# Patient Record
Sex: MALE | Race: WHITE | NOT HISPANIC OR LATINO | Employment: FULL TIME | URBAN - METROPOLITAN AREA
[De-identification: names, ages, dates, MRNs, and addresses within clinical notes are randomized per-mention and may not be internally consistent; named-entity substitution may affect disease eponyms.]

---

## 2017-12-04 ENCOUNTER — TELEPHONE (OUTPATIENT)
Dept: BEHAVIORAL HEALTH | Facility: CLINIC | Age: 27
End: 2017-12-04

## 2017-12-04 NOTE — TELEPHONE ENCOUNTER
Pt seeking cd eval for lodging adm. Pt report having been incarcerated past 7 days david griffin for recent charge of 5th degree possession.  This is pts 2nd charge of this nature.   Pt reports not being court ordered at this time, but wanting to be proactive.  Pt reports hx of heroin use.  lucero 11-27-17.

## 2017-12-07 ENCOUNTER — HOSPITAL ENCOUNTER (OUTPATIENT)
Dept: BEHAVIORAL HEALTH | Facility: CLINIC | Age: 27
Discharge: HOME | End: 2017-12-07
Attending: SOCIAL WORKER | Admitting: SOCIAL WORKER
Payer: COMMERCIAL

## 2017-12-07 VITALS — WEIGHT: 158 LBS | HEIGHT: 72 IN | BODY MASS INDEX: 21.4 KG/M2

## 2017-12-07 PROCEDURE — H0001 ALCOHOL AND/OR DRUG ASSESS: HCPCS

## 2017-12-07 ASSESSMENT — ANXIETY QUESTIONNAIRES
2. NOT BEING ABLE TO STOP OR CONTROL WORRYING: SEVERAL DAYS
7. FEELING AFRAID AS IF SOMETHING AWFUL MIGHT HAPPEN: NOT AT ALL
4. TROUBLE RELAXING: MORE THAN HALF THE DAYS
1. FEELING NERVOUS, ANXIOUS, OR ON EDGE: NEARLY EVERY DAY
GAD7 TOTAL SCORE: 7
6. BECOMING EASILY ANNOYED OR IRRITABLE: NOT AT ALL
5. BEING SO RESTLESS THAT IT IS HARD TO SIT STILL: NOT AT ALL
3. WORRYING TOO MUCH ABOUT DIFFERENT THINGS: SEVERAL DAYS

## 2017-12-07 ASSESSMENT — PATIENT HEALTH QUESTIONNAIRE - PHQ9: SUM OF ALL RESPONSES TO PHQ QUESTIONS 1-9: 16

## 2017-12-07 ASSESSMENT — PAIN SCALES - GENERAL: PAINLEVEL: MODERATE PAIN (4)

## 2017-12-07 NOTE — PROGRESS NOTES
[facility]  [address], [address]  [place] [address]        ADULT CD ASSESSMENT ADDENDUM      Patient Name: Kendell Prasad  Cell Phone:   Home: [phone] (home) none (work)   Mobile:   Telephone Information:   Mobile [phone]       Email:  [email or web address]  Emergency Contact: Beverley Karimi   Tel: [phone]    ________________________________________________________________________      The patient is  Single, no serious involvement    With which race do you identify? White    Family History   Mother   Living Father   Living   1 Step-mother   NA No Step-father   NA   Maternal Grandmother   Living Fraternal  Grandmother Living   Maternal Grandfather    Living Fraternal   Grandfather NA   1 Sister(s)   Living 1 Brother(s)   Living   1 Half-sister(s)   Living No Half-brother(s)   NA             Who raised you? (parents, grandparents, adoptive parents, step-parents, etc.)    Mother    Have any of your family members or significant others had problems with mental illness or substance abuse?  Please explain.    Depression in family    Do you have any children or Stepchildren? Yes, please explain: Luz (5), Alen (4)    Are you being investigated by Child Protection Services? No    Do you have a child protection worker, probation office or ? No    How would you describe your current finances?  In serious debt    If you are having problems, (unpaid bills, bankruptcy, IRS problems) please explain:  Yes, please explain: rebuilding    If working or a student are you able to function appropriately in that setting? n/a    Describe your preferred learning style:  by hands-on practice    What personal strengths do you have that can help you get sober?  Work ethic, family, motivated    Do you currently self-administer your medications?  N/A    Have you ever had to lie to people important to you about how much you mccoy?     No   Have you ever felt the need to bet more  and more money?     No   Have you ever attempted treatment for a gambling problem?     No   Have you ever touched or fondled someone else inappropriately or forced them to have sex with you against their will?     No   Are you or have you ever been a registered sex offender?     No   Is there any history of sexual abuse in your family?     No   Have you ever felt obsessed by your sexual behavior, such as having sex with many partners, masturbating often, using pornography often?     No   Have you ever received therapy or stayed in the hospital for mental health problems?     No   Have you ever hurt yourself, such as cutting, burning or hitting yourself?     No   Have you ever purged, binged or restricted yourself as a way to control your weight?     No   Are you on a special diet?     No   Do you have any concerns regarding your nutritional status?     No   Have you had any appetite changes in the last 3 months?     No   Have you had any weight loss or weight gain in the last 3 months?    If weight patient gained or lost was more than 10 lbs, then refer to program RN / attending Physician for assessment.     No   Was the patient informed of BMI?    Normal, No Intervention     Yes   Do you have any dental problems?     No   Have you ever lived through any trauma or stressful life events?     Yes, If yes explain: losing custody of chilren   In the past month, have you had any of the following symptoms related to the trauma listed above? (dreams, intense memories, flashbacks, physical reactions, etc.)     Yes, If yes explain: anxiety   Have you ever believed people were spying on you, or that someone was plotting against you or trying to hurt you?     No   Have you ever believed someone was reading your mind or could hear your thoughts or that you could actually read someone's mind or hear what another person was thinking?     No   Have you ever believed that someone of some force outside of yourself was putting  thoughts into your mind or made you act in a way that was not your usual self?  Have you ever though you were possessed?     No   Have you ever believed you were being sent special messages through the TV, radio or newspaper?     No   Have you ever heard things other people couldn't hear, such as voices or other noises?     No   Have you ever had visions when you were awake?  Or have you ever seen things other people couldn't see?     No       Suicide Screening Questions:   1. Are you feeling hopeless about the present/future?     No   2. Have you ever had thoughts about taking your life?     Yes   3. When did you have these thoughts?     Age 19   4. Do you have any current intent or active desire to take your life?     No   5. Do you have a plan to take your life?     No   6. Have you ever made a suicide attempt?     Yes, If yes explain: age 19 (OD)   7. Do you have access to pills, guns or other methods to kill yourself?     No     Guide to Risk Ratings   IDEATION: Active thoughts of suicide? INTENT: Intent to follow on suicide? PLAN: Plan to follow through on suicide? Level of Risk:   IF Yes Yes Yes Patient = High Emergent   IF Yes Yes No Patient = High Urgent/Non-Emergent   IF Yes No No Patient = Moderate Non-Urgent   IF No No   No Patient = Low Risk   The patient's ADDITIONAL RISK FACTORS and lack of PROTECTIVE FACTORS may increase their overall suicide risk ratings.     Patient's Responses (within the last 30 days)   IDEATION: Active thoughts of suicide?    No     INTENT: Intent to follow on suicide?    No     PLAN: Plan to follow through on suicide?    No     Determining the level of risk depends on the patient responses, suicide risk factors and protective factors.     Additional Risk Factors: Significant history of having untreated or poorly treated mental health symptoms  Significant history of untreated or poorly treated chronic pain issues  A recent loss that was significant to the patient, i.e. loss of  "job, loss of home, divorce, break-up, etc.  Substance abuse   Protective Factors:  Having people in his/her life that would prevent the patient from considering committing suicide (i.e. young children, spouse, parents, etc.)  Having easy access to supportive family members     Risk Status   Emergent? No   Urgent / Non-Emergent? No   Present / Non- Urgent? No    Low Risk? Yes, Evaluation Counselors - Document in Epic / SBAR to counselor \"No identified risk\" and Treatment Counselors - Assess weekly in progress notes under Dimension 3 and summarize in Discharge / Treatment summary under Dimension 3.   Additional information to support suicide risk rating: See Above       Mental Health Status   Physical Appearance/Attire: Appears stated age and Attire appropriate to age/situation   Hygiene: well groomed   Eye Contact: at examiner   Speech Rate:  regular   Speech Volume: regular   Speech Quality: fluid   Cognitive/Perceptual:  reality based   Cognition: memory intact    Judgment: intact   Insight: intact   Orientation:  time, place, person and situation   Thought::   logical    Hallucinations:  none   General Behavioral Tone: cooperative   Psychomotor Activity: no problem noted   Gait:  no problem   Mood: appropriate   Affect: congruence/appropriate   Counselor Notes: NA       Criteria for Diagnosis: DSM-5 Criteria for Substance Use Disorders      Opioid Use Disorder Severe - 304.00 (F11.20)  Amphetamine Use Disorder Severe - 304.40 (F15.20)  Tobacco Use Disorder Moderate - 305.10 (F17.200)      Level of Care   I.) Intoxication and Withdrawal: 0   II.) Biomedical:  1   III.) Emotional and Behavioral:  2   IV.) Readiness to Change:  0   V.) Relapse Potential: 3   VI.) Recovery Environmental: 3       Initial Problem List     The patient has poor coping skills  The patient lacks a sober peer support network  The patient lacks the ability to effectively manage his/her mental health issues    Patient/Client is willing to " follow treatment recommendations.  Yes    Counselor: Jennifer Alves [facility]      Vulnerable Adult Checklist for OUTPATIENTS     1.  Do you have a physical, emotional or mental infirmity or dysfunction?       No    2.  Does this issue impair your ability to provide for your own care without help, including providing yourself with food, shelter, clothing, healthcare or supervision?       No    3.  Because of this issue, I need assistance to protect myself from maltreatment by others.      No    Based on the above information:    This person is not a functional Vulnerable Adult according to [place] Statute 626.5572 subdivision 21.

## 2017-12-07 NOTE — TELEPHONE ENCOUNTER
----- Message from FAVIOLA Sandoval sent at 12/7/2017 12:09 PM CST -----  Regarding: Set up 1:1 appointment with me and mixed IOP CD Phase I appointments at [facility]  Please arrange to set up a 1:1 appointment with me at the [facility] location for 3:30PM on Monday, December 11. My provider number is [id number].     Also please arrange to set up 21 mixed IOP CD Phase I appointments for the above client beginning Monday, December 11 at 5:30PM. [id number].     Thanks    FAVIOLA Saldaña

## 2017-12-07 NOTE — PROGRESS NOTES
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 Severe   (F15.20) (304.40) Amphetamine type substance   Tobacco Use Disorder Moderate   (F17.200) (305.1)   Other (or unknown) Substance Use Disorder NA       Collateral Contact Summary   Number of contacts made: 1    Contact with referring person:  NA.    If court related records were reviewed, summarize here: NA    Information from collateral contacts supported/largely agreed with information from the client and associated risk ratings.      Rule 25 Assessment Summary and Plan   's Recommendation    Client is recommended to attend the outpatient treatment program at [facility], he was instructed to schedule appointments with providers to address mental health and pain issues prior to starting treatment.        Collateral Contacts     Name:    Terry Prasad   Relationship:    brother   Phone Number:    [phone] Releases:    Yes     snf roster has 2 fifth degree possession, another possession and the transaction fraud.  I feel like he has reached that rock bottom point and realized it is unhealthy and not safe, I think he wants to get better based upon our  conversation and him talking and us building our relationship.  I think he understands what he needs to do.      Collateral Contacts     Name:    n/a   Relationship:       Phone Number:       Releases:             anson Contacts      A problematic pattern of alcohol/drug use leading to clinically significant impairment or distress, as manifested by at least two of the following, occurring within a 12-month period:    There is a persistent desire or unsuccessful efforts to cut down or control alcohol/drug use  A great deal of time is spent in activities necessary to obtain alcohol, use alcohol, or recover from its effects.  Craving, or a strong desire or urge to use alcohol/drug  Continued alcohol use despite having persistent or recurrent social or interpersonal problems caused or exacerbated by the effects of alcohol/drug.  Important social, occupational, or recreational activities are given up or reduced because of alcohol/drug use.  Recurrent alcohol/drug use in situations in which it is physically hazardous.  Alcohol/drug use is continued despite knowledge of having a persistent or recurrent physical or psychological problem that is likely to have been caused or exacerbated by alcohol.  Tolerance, as defined by either of the following: A need for markedly increased amounts of alcohol/drug to achieve intoxication or desired effect. and A markedly diminished effect with continued use of the same amount of alcohol/drug.  Withdrawal, as manifested by either of the following: The characteristic withdrawal syndrome for alcohol/drug (refer to Criteria A and B of the criteria set for alcohol/drug withdrawal). and Alcohol/drug (or a closely related substance, such as a benzodiazepine) is taken to relieve or avoid withdrawal symptoms.      Specify if: In early remission:  After full criteria for alcohol/drug use disorder were previously met, none of the criteria for alcohol/drug use disorder have been met for at least  3 months but for less than 12 months (with the exception that Criterion A4,  Craving or a strong desire or urge to use alcohol/drug  may be met).     In sustained remission:   After full criteria for alcohol use disorder were previously met, non of the criteria for alcohol/drug use disorder have been met at any time during a period of 12 months or longer (with the exception that Criterion A4,  Craving or strong desire or urge to use alcohol/drug  may be met).   Specify if:   This additional specifier is used if the individual is in an environment where access to alcohol is restricted.    Mild: Presence of 2-3 symptoms    Moderate: Presence of 4-5 symptoms    Severe: Presence of 6 or more symptoms

## 2017-12-08 ASSESSMENT — ANXIETY QUESTIONNAIRES: GAD7 TOTAL SCORE: 7
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
## 2017-12-11 ENCOUNTER — HOSPITAL ENCOUNTER (OUTPATIENT)
Dept: BEHAVIORAL HEALTH | Facility: CLINIC | Age: 27
End: 2017-12-11
Attending: SOCIAL WORKER
Payer: COMMERCIAL

## 2017-12-11 PROCEDURE — H2035 A/D TX PROGRAM, PER HOUR: HCPCS | Mod: HQ

## 2017-12-12 ENCOUNTER — HOSPITAL ENCOUNTER (OUTPATIENT)
Dept: BEHAVIORAL HEALTH | Facility: CLINIC | Age: 27
End: 2017-12-12
Attending: SOCIAL WORKER
Payer: COMMERCIAL

## 2017-12-12 PROBLEM — F19.10 CHEMICAL ABUSE (H): Status: ACTIVE | Noted: 2017-12-12

## 2017-12-12 PROCEDURE — H2035 A/D TX PROGRAM, PER HOUR: HCPCS | Mod: HQ

## 2017-12-12 NOTE — PROGRESS NOTES
D- Client attended orientation 12/11/17 by himself. He was given an orientation packet which was reviewed with him in its entirety. The following was specifically reviewed with the client: Program philosophy, treatment goals, program schedules, education components, the family program, using treatment materials, program rules, client rights/responsibilities, information on HIV, STDS, Hepatitis, TB, information on use while pregnant, opioid information and Narcan information, program abuse prevention plan/reporting protocol, client grievance procedure, risks of treatment, confidentiality, treatment agreement, facility tour/safety information and information on co-occurring disorders. Client was also given information about his insurance and was given the business office contact information should he have any questions. Client was introduced to the stages of change.     I- Signing of paperwork, tour of facility, provided client with counselor's name and phone number. Client contributed to treatment plan goals. Client was told his start date at [facility] will  be 12/1117.      A- Client was an engaged and cooperative. He understands the stages of change and his current readiness for change.    P- Client will review orientation paperwork and materials. Client will share his feelings about attending group treatment at his first phase I session as well as history leading up to current admission to CD treatment. Seek out and attend sober support groups. Introduce self at first Phase I group. Give recent history leading up to attendance.

## 2017-12-12 NOTE — PROGRESS NOTES
Initial Services Plan        Before your first treatment group, please do the following    Immediate health & safety concerns: Look for a support network (such as AA, NA, DBT group, a Denominational group, etc.)    Suggestions for client during the time between intake & completion of treatment plan:  Introduce yourself to the treatment group.  Spend time getting to know your peers.  Complete the problem list for your treatment plan.  Review your patient or client handbook.  Identify concerns about whom to ask for family week    Client issues to be addressed in the first treatment sessions:  Arrange  and transportation as needed.  Identify motivations(s) for coming to treatment, i.e. legal, family, job, self  Identify concerns about coming into treatment, i.e. fear of failing again, sharing a room in treatment  Identify concerns about contacting BUDDY greene LADC  12/12/2017  2:01 PM

## 2017-12-15 NOTE — TELEPHONE ENCOUNTER
----- Message from FAVIOLA Sandoval sent at 12/14/2017  3:50 PM CST -----  Regarding: Set up 1:1 appointment with me at [facility] location  Please arrange to set up a 1:1 appointment with me for the above client at the [facility] location for Tuesday, Dec 19 at 4:30PM. My provider number is [phone].  Thanks  FAVIOLA Saldaña

## 2017-12-18 ENCOUNTER — BEH TREATMENT PLAN (OUTPATIENT)
Dept: BEHAVIORAL HEALTH | Facility: CLINIC | Age: 27
End: 2017-12-18

## 2017-12-18 NOTE — TREATMENT PLAN
Acknowledgement of Current Treatment Plan       I have reviewed my treatment plan with my therapist / counselor on ***. I agree with the plan as it is written in the electronic health record.    Name Signature   Kendell Prasad    Name of Therapist / Counselor    FAVIOLA Saldaña            Date of last use: _____________________________________________________

## 2017-12-18 NOTE — TREATMENT PLAN
[facility]  Adult Chemical Dependency Program  Treatment Plan Requirements    These services are provided by the facility for each patient/client according to the individual's treatment plan:    Individual and group counseling    Education    Transition services    Services to address any co-occurring mental illness    Service coordination    Initial Treatment Plan Goals:  1. Complete all the requirements of Program Orientation.  2. Maintain medication compliance throughout the program.  3. Complete requirements for workshop/skills groups based on identified issues on your problem list.  4. Complete the support group attendance feedback sheet weekly.  5. Gain family involvement in treatment process to address family issues from the problem list.  6. Attend and participate in all required groups per individual treatment plan.  7. Focus attention to individualized issues from the treatment plan.  8. Complete all requirements for UA's, alcohol screening tests and other testing.  9. Schedule a physical examination if recommended.    In addition to the above, complete all individual goals as specifically outlines on your treatment plan.    Criteria for discharge:  Patients/clients are discharged from the program following completion of the entire program including Phase I and II or acceptance of other post-treatment referrals such as half-way house, or aftercare at other facilities.  Patients/clients may also be discharged for inappropriate behavior or chemical use.      Favorable Discharge - Patients/clients have completed agreed upon treatment goals, understand their diagnosis and appear motivated about the follow-up care.    Guarded Discharge - Patients/clients have demonstrated some understanding of their diagnosis and recovery process, and have completed some of their treatment goals.  This prognosis also includes patients/clients who have completed some treatment goals but have not made  commitment to community support or follow through with referrals.    Unfavorable Discharge - Patients/clients have not completed agreed upon treatment goals due to their own choice, have limited understanding of their diagnosis, and have shown minimal or inconsistent behavior conducive to recovery.  Those patients/clients discharged due to behavioral problems will also be unfavorable discharge.       Adult CD Treatment Plan                                Kendell Prasad   [id number]   1990 27 year old Male    Acute Intoxication/Withdrawal Potential     DIMENSION 1  RISK FACTOR: 0     SUBSTANCE USE DISORDERS:  Heroin and Amphetamines            Date Assigned Source Area of Treatment Focus / Goal / Treatment Strategies    Target  Date Initials Outcome Date Completed   December 18, 2017   Self -  Current and Assessment -  Current  Area of Treatment Focus:   Substance use, cravings and urges.  Last use date was reported as 11/29/2017.       Goal:   Develop effective strategies to maintain sobriety.      Treatment Strategies:   Report to counselor and group any alcohol or drug use. 4/15/2018 KRN Refused     Client was DC'd from [facility] CD program for missing 7 of 11 scheduled phase I appoiontemnts. No call, no show.  January 10, 2018       Biomedical Conditions and Complaints     DIMENSION 2  RISK FACTOR: 1             Date Assigned Source Area of Treatment Focus / Goal / Treatment Strategies Target  Date Initials Outcome Date Completed   December 18, 2017   Self -  Current and Assessment -  Current  Area of Treatment Focus:  Client/Patient has a medical diagnosis of from EMR: .     Past Medical History:   Diagnosis Date     Arthritis      Depression      H/O suicide attempt 9/2010    medication overdose     Ulcer (H)      Goal:   Follow recommendations of medical provider.    Treatment Strategies:  D Report date of scheduled appointment to counselor, Report change in severity of symptoms to staff.  and Continue  to take prescribed medications and follow-up with medical interventions while in program. 4/15/2018 KRN Refused January 10, 2018       December 18, 2017   Self -  Current and Assessment -  Current  Problem: Lack of regular exercise program.   Goal: Establish program of regular physical activity while in CD treatment.   Intervention: Report on regular exercise program each week.  4/15/2018 KRN Refused January 10, 2018       Emotional/Behavioral/Cognitive Conditions and Complications     DIMENSION 3  RISK FACTOR: 2             Date Assigned Source Area of Treatment Focus / Goal / Treatment Strategies Target  Date Initials Outcome Date Completed     December 19, 2017   Self -  Current and Assessment -  Current  Area of Treatment Focus:   History of symptoms of depression, anxiety, PTSD..    and Client/Patient may have an undiagnosed mental health issue.       Goal:   Schedule a psychatric evaluation.    Treatment Strategies:    Meet/make appointment with psychiatrist. 1/1/2018 KRN Refused January 10, 2018       December 19, 2017   Self -  Current and Assessment -  Current  Problem: Client reports use of non prescribed chemicals as coping mechanism for BEH symptoms.   Goal: Become medication compliant. , Understand the relationship between addiction and mental health issues. and Learn skills to express anxiety, depression in a healthy and appropriate way.  Intervention:  Identify 5 coping skills to reduce anxiety, depression..  Practice techniques daily and when needed. and List 5 ways your addiction has impacted your mental health.  2/1/2018 KRN Refused January 10, 2018         Readiness to Change     DIMENSION 4  RISK FACTOR: 0             Date Assigned Source Area of Treatment Focus / Goal / Treatment Strategies Target  Date Initials Outcome Date Completed   December 19, 2017   Self -  Current and Assessment -  Current  Area of Treatment Focus:  {Increase internal motivation for sobriety. Move up stages of change.      Goal:   Understand the impact your substance use has had on you., Understand the impact your substance use has had on your family and significant relationships. and Increase internal motivation.    Treatment Strategies:   Invite family and concerned persons to family program. and Participate in spiritual care groups. 1/15/2018 KRN Refused January 10, 2018        Relapse/Continues Use/Continues Problem Potential     DIMENSION 5  RISK FACTOR: 3                 Date Assigned Source Area of Treatment Focus / Goal / Treatment Strategies Target  Date Initials Outcome Date Completed   December 19, 2017   Self -  Current and Assessment -  Current  Area of Treatment Focus:   Lacks a daily routine that includes healthy and sober living skills.       Goal:   Develop sober coping and living skills.    Treatment Strategies:   Develop a relapse prevention plan including lifestyle changes, recovery activities, daily structure, self-care, support systems, spirituality, work, finances, recreation and crisis management.  and Identify and begin attending sober support groups. 1/1/2018 KRN Refused January 10, 2018       December 19, 2017   Self -  Current and Assessment -  Current  Problem: History of relapse and self-sabotage. , Client/Patient does not recognize relapse triggers and warning signs.    and Client/Patient has cross addiction with amphetamines and opioids..     Goal: Identify personal triggers and relapse warning signs., Identify what led to your last relapse., Identify and understand personal relapse and self-sabotage patterns. and Develop insight on how cross-addiction has impacted your life.  Intervention: Complete the triggers and cravings assignment and include alternative behaviors. and Describe your last relapse and what led to it including feelings and situations. 1/15/2018 KRN Refused January 10, 2018       Recovery Environment     DIMENSION 6  RISK FACTOR: 3                 Date Assigned Source Area of  Treatment Focus / Goal / Treatment Strategies Target  Date Initials Outcome Date Completed   December 19, 2017   Self -  Current and Assessment -  Current  Area of Treatment Focus:   Lacks sober support network.       Goal:   Develop a sober support network.    Treatment Strategies:   List 10 situations, people, emotional responses that are unhealthy.  Develop a plan to avoid or manage them.   3/1/2018 KRN Refused January 10, 2018       December 19, 2017   Self -  Current and Assessment -  Current  Problem: Client/Patient reports involvement with legal.  Current  Goal: Resolve issues with legal and possible probation after court date(s).  Intervention: Comply with the requirements of legal/probation. . 4/15/2018 KRN Refused January 10, 2018         Individual abuse prevention plan (required for lodging plus) : specific actions, referral:   No additional protection measures required other than the Program Abuse Prevention Plan - No     All interventions that are designated as  current  will need to be completed in order to transition out of treatment with a favorable prognosis.  The treatment plan is a flexible document and a work in progress.  Interventions and goals may be added at any time to customize plan to each individual s needs.  Client may work with therapist to change interventions as long as they pertain to the goals stipulated in the plan and/or are clinically driven.

## 2017-12-19 NOTE — TELEPHONE ENCOUNTER
"Talked to client after he returned my earlier call. He was upset and crying stating he had \"slept through\" treatment yesterday because he's been stressed and unable to sleep because his grandmother is doing poorly in the hospital and he is \"her only support\". Not thinking about CD treatment, but does want to return. Reported his uncle is flying up to help tomorrow and he intends to attend a 1:1 with this counselor and group.  Rodolfo Bridges, LADC  "

## 2017-12-19 NOTE — TELEPHONE ENCOUNTER
Left VM with client asking about no call, no show from last night and if he planned to attend group and our scheduled 1:1 on 12/19. Asked for call back.  FAVIOLA Saldaña

## 2017-12-20 ENCOUNTER — HOSPITAL ENCOUNTER (OUTPATIENT)
Dept: BEHAVIORAL HEALTH | Facility: CLINIC | Age: 27
End: 2017-12-20
Attending: SOCIAL WORKER
Payer: COMMERCIAL

## 2017-12-20 NOTE — TREATMENT PLAN
Patient Safety Plan Template    Name:   Kendell Prasad YOB: 1990 Age:  27 year old MR Number:  [id number]   Step 1: Warning signs (Thoughts, images, mood, situation, behavior) that a crisis may be developin. {Wildcard & NA:579271}     2. {Wildcard & NA:230571}     3. {Wildcard & NA:419560}     Step 2: Internal coping strategies - Things I can do to take my mind off of my problems without contacting another person (relaxation technique, physical activity):     1. {Wildcard & NA:199857}     2. {Wildcard & NA:981835}     3. {Wildcard & NA:862607}     Step 3: People and social settings that provide distraction:     1. Name: {Wildcard & NA:839356}   Phone: {Wildcard & NA:040665}   2. Name: {Wildcard & NA:714608}   Phone: {Wildcard & NA:705719}   3. Place: {Wildcard & NA:638342}   4. Place: {Wildcard & NA:407200}     The one thing that is most important to me and worth living for is: ***     Step 4: People whom I can ask for help:     1. Name: {Wildcard & NA:243695}   Phone: {Wildcard & NA:185607}     2. Name: {Wildcard & NA:915323}   Phone: {Wildcard & NA:577935}     3. Name: {Wildcard & NA:422048}   Phone: {Wildcard & NA:014548}     Step 5: Professionals or agencies I can contact during a crisis:     1. Clinician Name: {Wildcard & NA:955692}   Phone: {Wildcard & NA:339071}   Clinician Pager or Emergency Contact #: {Wildcard & NA:913668}     2. Clinician Name: FAVIOLA Saldaña   Phone: [phone]     Clinician Pager or Emergency Contact #: NA     3. Local Urgent Care Services: {Wildcard & NA:944180}    Urgent Care Services Address: {Wildcard & NA:565928}    Urgent Care Services Phone: {Wildcard & NA:343865}     4. Suicide Prevention Lifeline Phone: 3-923-403-EWNG (5867)     Step 6: Making the environment safe:     1. {Wildcard & NA:822784}     2. {Wildcard & NA:114041}     Safety Plan Template 2008 Chen Jay and Claudy Carlos is reprinted with the express permission of the  authors.  No portion of the Safety Plan Template may be reproduced without the express, written permission.  You can contact the authors at [email or web address] or jose [email or web address].

## 2017-12-26 ENCOUNTER — HOSPITAL ENCOUNTER (OUTPATIENT)
Dept: BEHAVIORAL HEALTH | Facility: CLINIC | Age: 27
End: 2017-12-26
Attending: SOCIAL WORKER
Payer: COMMERCIAL

## 2017-12-26 PROCEDURE — H2035 A/D TX PROGRAM, PER HOUR: HCPCS | Mod: HQ

## 2017-12-27 ENCOUNTER — HOSPITAL ENCOUNTER (OUTPATIENT)
Dept: BEHAVIORAL HEALTH | Facility: CLINIC | Age: 27
End: 2017-12-27
Attending: SOCIAL WORKER
Payer: COMMERCIAL

## 2017-12-27 PROCEDURE — H2035 A/D TX PROGRAM, PER HOUR: HCPCS | Mod: HQ

## 2017-12-27 NOTE — PROGRESS NOTES
CD ADULT Progress Note     Treatment Plan Review completed on:  12/26    Attendance Dates: 12/26, 12/27    Total # of Group Sessions:  2     MONDAY TUESDAY WEDNESDAY THURSDAY FRIDAY SATURDAY SUNDAY Total   Group Therapy  2 2        Specialty Groups*           1:1           Family Program           [place]             Phase II             Absent   2        Total             *Specialty Groups include Mental Health Care, Assertiveness and Communication, Sobriety Maintenance Skills, Spiritual Care, Stress Management, Relapse Prevention, Family Systems.                  Learning Style:  Hands on  practice    Staff member contributing:  Jessenia Dela Cruz MA, [facility] [place][note 2]    Received supervision:  No    Client:  contributed to goals and plan    Did Client receive a copy of treatment plan/revised plan:  1/3/18    Changes to Treatment Plan:  No    Client agrees with plan/revised plan:  NA    Any changes in Vulnerable Adult Status:  No    Substance Use Disorders:  Opioid Use Disorder Severe (F11.20), Stimulant Related Disorder Severe (F15.20)  Amphetamine type substance and Tobacco Use Disorder Moderate (F17.200)      ASAM Risk Ratings and Data       DIMENSION 1: Acute Intoxication/Withdrawal  The client's ability to cope with withdrawal symptoms and current state of intoxication       Acute Intoxication/Withdrawal - Current Risk Factor:  0    Reporting sober date of 11/29/17    Goals:  Remain sober, report all use of chemicals to counselor.     Data:  Client reported last use on 11/28. He has been absent for an extended period. Client said he is struggling and wants to get an appointment and consider Suboxone.     DIMENSION 2:  Biomedical Conditions and Complaints  The degree to which any physical disorder would interfere with treatment for substance abuse and the client's ability to tolerate any related discomfort     Biomedical Conditions and Complaints - Current Risk Factor:  1    Goals: Remain medically compliant. From client  EMR:     Past Medical History:   Diagnosis Date     Arthritis      Depression      H/O suicide attempt 9/2010    medication overdose     Ulcer (H)      Data:    Client seemed depressed and and said he was miserable. He struggles with excessive negativity and seems overwhelmed in recovery.    DIMENSION 3:  Emotional/Behavioral/Cognitive Conditions and Complications  The degree to which any condition or complications are likely to interfere with treatment for substance abuse or with function in significant life areas and the likelihood of risk of harm to self or others.     Emotional/Behavioral - Current Risk Factor:  2    DSM-5 Diagnoses:   Reported by client depression, anxiety, PTSD.     Suicide Assessment:  Risk Status    Ideation - Active thoughts of suicide Intent to follow through on suicide Plan for completing suicide    Yes No Yes No Yes No   Emergent  x  x  x   Urgent / Non-Emergent  x  x  x   Non- Urgent  x  x  x   No Current/Active Risk   x  x  x     Goals: Client needs to meet with psychiatrist/physician and discuss depression..     Data:  12/26 Client reported that he was miserable and had no positive experiences he could reflect upon.    DIMENSION 4:  Readiness to Change  Consider the amount of support and encouragement necessary to keep the client involved in treatment.     Readiness to Change - Current Risk Factor:  3    Goals:  Move up stages of change. Better understand disease of addiction.     Data:  Client is struggling with his recovery. He has been absent for several weeks and dos not seem motivated to chage. He did say that he would like to talk to someone about Suboxone. He was very negative about his recovery and ability to sustain it.     DIMENSION 5:  Relapse/Continued Use/Continued Problem Potential  Consider the degree to which the client recognizes relapse issues and has the skills to prevent relapse of either substance use or mental health problems.     Relapse/Continued Use/Continued  "Problem Potential - Current Risk Factor:  3    Goals:  Attend treatment regularly, complete assignments and follow all treatment recommendations.  Treatment Contract needs to be considered.      Data:   Client has been absent several weeks. He expressed feeling negative and hopeless about his recovery and his life.    DIMENSION 6:  Recovery Environment  Consider the degree to which key areas of the client's life are supportive of or antagonistic to treatment participation and recovery.     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     Support group attended this week:  No    Did family agree to attend family week:  NA    If yes:  none schedule this week    Goals:  TBD with treatment plan.     Data:  No new updates.          Intervention: Group members discussed relapse prevention plans. Additionally, client reviewed DBT overview and states of mind-wise, reasonable and emotional- and were able to demonstrate understanding of these by sharing two examples of what these mean to them. Client also reviewed holiday relapse prevention plan and DBT \"Radical Acceptance\" skill and shared an issue or experience that he struggled to accept.    Assessment:  Stages of Change Model  Contemplation Client has not attended regularly. Expressed negative feelings and hopelessness. He needs individual counseling and requested referral suggestions to find physician who can prescribe Suboxone.     Plan:  Seek out and attend sober support. Return treatment plan goals to counselor. Report on progress in locating MAT provider for opioid cravings and WD symptoms.   "

## 2018-01-10 NOTE — TELEPHONE ENCOUNTER
Left VM with client that he is DC'd from group for missed appointments. Asked for call back to discuss options for referral to higher level of care since OP isn't working for him apparently.   Rodolfo Bridges, LADC

## 2018-02-05 ENCOUNTER — HOSPITAL ENCOUNTER (EMERGENCY)
Facility: CLINIC | Age: 28
Discharge: PSYCHIATRIC OR SUBSTANCE USE FACILITY | End: 2018-02-06
Attending: EMERGENCY MEDICINE | Admitting: EMERGENCY MEDICINE
Payer: COMMERCIAL

## 2018-02-05 VITALS
BODY MASS INDEX: 20.9 KG/M2 | OXYGEN SATURATION: 100 % | WEIGHT: 154.32 LBS | DIASTOLIC BLOOD PRESSURE: 82 MMHG | SYSTOLIC BLOOD PRESSURE: 120 MMHG | RESPIRATION RATE: 16 BRPM | HEIGHT: 72 IN | TEMPERATURE: 98.1 F

## 2018-02-05 DIAGNOSIS — F19.10 SUBSTANCE ABUSE (H): ICD-10-CM

## 2018-02-05 DIAGNOSIS — R45.851 SUICIDAL IDEATION: ICD-10-CM

## 2018-02-05 LAB
ANION GAP SERPL CALCULATED.3IONS-SCNC: 4 MMOL/L (ref 3–14)
BASOPHILS # BLD AUTO: 0.1 10E9/L (ref 0–0.2)
BASOPHILS NFR BLD AUTO: 0.6 %
BUN SERPL-MCNC: 18 MG/DL (ref 7–30)
CALCIUM SERPL-MCNC: 8.7 MG/DL (ref 8.5–10.1)
CHLORIDE SERPL-SCNC: 100 MMOL/L (ref 94–109)
CO2 SERPL-SCNC: 32 MMOL/L (ref 20–32)
CREAT SERPL-MCNC: 0.94 MG/DL (ref 0.66–1.25)
DIFFERENTIAL METHOD BLD: NORMAL
EOSINOPHIL # BLD AUTO: 0.2 10E9/L (ref 0–0.7)
EOSINOPHIL NFR BLD AUTO: 2 %
ERYTHROCYTE [DISTWIDTH] IN BLOOD BY AUTOMATED COUNT: 12.2 % (ref 10–15)
GFR SERPL CREATININE-BSD FRML MDRD: >90 ML/MIN/1.7M2
GLUCOSE SERPL-MCNC: 112 MG/DL (ref 70–99)
HCT VFR BLD AUTO: 40.3 % (ref 40–53)
HGB BLD-MCNC: 13.7 G/DL (ref 13.3–17.7)
IMM GRANULOCYTES # BLD: 0 10E9/L (ref 0–0.4)
IMM GRANULOCYTES NFR BLD: 0.5 %
LYMPHOCYTES # BLD AUTO: 2.3 10E9/L (ref 0.8–5.3)
LYMPHOCYTES NFR BLD AUTO: 26.7 %
MCH RBC QN AUTO: 28.5 PG (ref 26.5–33)
MCHC RBC AUTO-ENTMCNC: 34 G/DL (ref 31.5–36.5)
MCV RBC AUTO: 84 FL (ref 78–100)
MONOCYTES # BLD AUTO: 0.9 10E9/L (ref 0–1.3)
MONOCYTES NFR BLD AUTO: 10.5 %
NEUTROPHILS # BLD AUTO: 5.2 10E9/L (ref 1.6–8.3)
NEUTROPHILS NFR BLD AUTO: 59.7 %
NRBC # BLD AUTO: 0 10*3/UL
NRBC BLD AUTO-RTO: 0 /100
PLATELET # BLD AUTO: 287 10E9/L (ref 150–450)
POTASSIUM SERPL-SCNC: 3.5 MMOL/L (ref 3.4–5.3)
RBC # BLD AUTO: 4.8 10E12/L (ref 4.4–5.9)
SODIUM SERPL-SCNC: 136 MMOL/L (ref 133–144)
WBC # BLD AUTO: 8.7 10E9/L (ref 4–11)

## 2018-02-05 PROCEDURE — 80048 BASIC METABOLIC PNL TOTAL CA: CPT | Performed by: EMERGENCY MEDICINE

## 2018-02-05 PROCEDURE — 80329 ANALGESICS NON-OPIOID 1 OR 2: CPT | Performed by: EMERGENCY MEDICINE

## 2018-02-05 PROCEDURE — 85025 COMPLETE CBC W/AUTO DIFF WBC: CPT | Performed by: EMERGENCY MEDICINE

## 2018-02-05 PROCEDURE — 90791 PSYCH DIAGNOSTIC EVALUATION: CPT

## 2018-02-05 PROCEDURE — 99285 EMERGENCY DEPT VISIT HI MDM: CPT | Mod: 25

## 2018-02-05 PROCEDURE — 36415 COLL VENOUS BLD VENIPUNCTURE: CPT | Performed by: EMERGENCY MEDICINE

## 2018-02-05 ASSESSMENT — ENCOUNTER SYMPTOMS: WOUND: 1

## 2018-02-06 ENCOUNTER — HOSPITAL ENCOUNTER (INPATIENT)
Facility: CLINIC | Age: 28
LOS: 6 days | Discharge: HOME | DRG: 885 | End: 2018-02-12
Attending: PSYCHIATRY & NEUROLOGY | Admitting: PSYCHIATRY & NEUROLOGY
Payer: COMMERCIAL

## 2018-02-06 DIAGNOSIS — F41.1 GAD (GENERALIZED ANXIETY DISORDER): Primary | ICD-10-CM

## 2018-02-06 DIAGNOSIS — F33.1 MODERATE EPISODE OF RECURRENT MAJOR DEPRESSIVE DISORDER (H): ICD-10-CM

## 2018-02-06 DIAGNOSIS — F11.20 HEROIN USE DISORDER, SEVERE (H): ICD-10-CM

## 2018-02-06 PROBLEM — R45.851 SUICIDAL IDEATION: Status: ACTIVE | Noted: 2018-02-06

## 2018-02-06 LAB
APAP SERPL-MCNC: <2 MG/L (ref 10–20)
INTERPRETATION ECG - MUSE: NORMAL

## 2018-02-06 PROCEDURE — 99223 1ST HOSP IP/OBS HIGH 75: CPT | Mod: AI | Performed by: PSYCHIATRY & NEUROLOGY

## 2018-02-06 PROCEDURE — 12400001 ZZH R&B MH UMMC

## 2018-02-06 PROCEDURE — 25000132 ZZH RX MED GY IP 250 OP 250 PS 637: Performed by: PSYCHIATRY & NEUROLOGY

## 2018-02-06 RX ORDER — TRAZODONE HYDROCHLORIDE 50 MG/1
50 TABLET, FILM COATED ORAL
Status: DISCONTINUED | OUTPATIENT
Start: 2018-02-06 | End: 2018-02-06

## 2018-02-06 RX ORDER — OLANZAPINE 10 MG/1
10 TABLET ORAL
Status: DISCONTINUED | OUTPATIENT
Start: 2018-02-06 | End: 2018-02-12 | Stop reason: HOSPADM

## 2018-02-06 RX ORDER — HYDROXYZINE HYDROCHLORIDE 25 MG/1
25-50 TABLET, FILM COATED ORAL EVERY 4 HOURS PRN
Status: DISCONTINUED | OUTPATIENT
Start: 2018-02-06 | End: 2018-02-12 | Stop reason: HOSPADM

## 2018-02-06 RX ORDER — TRAZODONE HYDROCHLORIDE 50 MG/1
50 TABLET, FILM COATED ORAL AT BEDTIME
Status: DISCONTINUED | OUTPATIENT
Start: 2018-02-06 | End: 2018-02-12 | Stop reason: HOSPADM

## 2018-02-06 RX ORDER — BUPRENORPHINE AND NALOXONE 4; 1 MG/1; MG/1
1 FILM, SOLUBLE BUCCAL; SUBLINGUAL DAILY
Status: DISCONTINUED | OUTPATIENT
Start: 2018-02-06 | End: 2018-02-08

## 2018-02-06 RX ORDER — ACETAMINOPHEN 325 MG/1
650 TABLET ORAL EVERY 4 HOURS PRN
Status: DISCONTINUED | OUTPATIENT
Start: 2018-02-06 | End: 2018-02-12 | Stop reason: HOSPADM

## 2018-02-06 RX ORDER — ALUMINA, MAGNESIA, AND SIMETHICONE 2400; 2400; 240 MG/30ML; MG/30ML; MG/30ML
30 SUSPENSION ORAL EVERY 4 HOURS PRN
Status: DISCONTINUED | OUTPATIENT
Start: 2018-02-06 | End: 2018-02-12 | Stop reason: HOSPADM

## 2018-02-06 RX ORDER — CLONIDINE HYDROCHLORIDE 0.1 MG/1
0.1 TABLET ORAL 2 TIMES DAILY PRN
Status: DISCONTINUED | OUTPATIENT
Start: 2018-02-06 | End: 2018-02-10

## 2018-02-06 RX ORDER — QUETIAPINE FUMARATE 200 MG/1
200 TABLET, FILM COATED ORAL DAILY
Status: DISCONTINUED | OUTPATIENT
Start: 2018-02-06 | End: 2018-02-12 | Stop reason: HOSPADM

## 2018-02-06 RX ORDER — LORAZEPAM 0.5 MG/1
0.5 TABLET ORAL DAILY
Status: COMPLETED | OUTPATIENT
Start: 2018-02-06 | End: 2018-02-06

## 2018-02-06 RX ORDER — BUPRENORPHINE AND NALOXONE 4; 1 MG/1; MG/1
1 FILM, SOLUBLE BUCCAL; SUBLINGUAL DAILY
Status: DISCONTINUED | OUTPATIENT
Start: 2018-02-07 | End: 2018-02-06

## 2018-02-06 RX ORDER — BUPRENORPHINE AND NALOXONE 4; 1 MG/1; MG/1
1 FILM, SOLUBLE BUCCAL; SUBLINGUAL DAILY
Status: DISCONTINUED | OUTPATIENT
Start: 2018-02-06 | End: 2018-02-06

## 2018-02-06 RX ORDER — BISACODYL 10 MG
10 SUPPOSITORY, RECTAL RECTAL DAILY PRN
Status: DISCONTINUED | OUTPATIENT
Start: 2018-02-06 | End: 2018-02-12 | Stop reason: HOSPADM

## 2018-02-06 RX ORDER — OLANZAPINE 10 MG/2ML
10 INJECTION, POWDER, FOR SOLUTION INTRAMUSCULAR
Status: DISCONTINUED | OUTPATIENT
Start: 2018-02-06 | End: 2018-02-12 | Stop reason: HOSPADM

## 2018-02-06 RX ADMIN — TRAZODONE HYDROCHLORIDE 50 MG: 50 TABLET ORAL at 20:15

## 2018-02-06 RX ADMIN — BUPRENORPHINE HYDROCHLORIDE, NALOXONE HYDROCHLORIDE 1 FILM: 4; 1 FILM, SOLUBLE BUCCAL; SUBLINGUAL at 19:56

## 2018-02-06 RX ADMIN — QUETIAPINE FUMARATE 200 MG: 200 TABLET ORAL at 15:59

## 2018-02-06 RX ADMIN — LORAZEPAM 0.5 MG: 0.5 TABLET ORAL at 15:59

## 2018-02-06 NOTE — ED PROVIDER NOTES
"  History     Chief Complaint:  Suicide Attempt    HPI   Kendell Prasad is a 27 year old male with a history of drug abuse, depression, and suicide attempt who presents with a suicide attempt. The patient's mother brought him to the ED today but has since left. The patient states he \"hates life today\" because his best friend  yesterday and his other best friend  a few months ago. He notes he cut both of his arms with an Exacto knife in attempt to relieve his emotional pain earlier today. He notes he had a prior suicide attempt when he was 19 where he intentionally overdosed on Percocet. He also notes he is a heroin addict and last injected heroin an hour ago. He states he no longer sees a psychologist but when he did, he was taking medication for his anxiety and depression, was able to work, and was sober for 6 months (until 2017). The patient is no longer taking his medications and is not able to work due to the severity of his mental health issues and drug addiction. He reports he has never harmed himself like this before and was not trying to kill himself, but that \"things would be easier if he wasn't alive.\" He denies any other health concerns or pain. Of note, the patient's last tetanus vaccine was in  (up to date).    Allergies:  Clindamycin  Latex  Neosporin  Penicillins  Pertussis vaccine  Vancomycin     Medications:    The patient is currently on no regular medications.    Past Medical History:    Arthritis  Depression  Suicide attempt  Ulcer  Chemical abuse    Past Surgical History:    Appendectomy  Eye surgery  Tonsillectomy  Finger surgery    Family History:    Depression  Anxiety  Bipolar disorder  Substance abuse    Social History:  Smoking status: Current every day smoker  Alcohol use: Yes  Every day heroin user  Marital Status:  Single [1]     Review of Systems   Skin: Positive for wound (bilateral arm lacerations).   Psychiatric/Behavioral: Positive for suicidal ideas.   All " other systems reviewed and are negative.      Physical Exam     Patient Vitals for the past 24 hrs:   BP Temp Heart Rate Resp SpO2 Height Weight   02/05/18 2224 120/82 98.1  F (36.7  C) 98 16 100 % 1.829 m (6') 70 kg (154 lb 5.2 oz)       Physical Exam  General: Patient is alert and interactive when I enter the room  Head:  The scalp, face, and head appear normal  Eyes:  Conjunctivae are normal  ENT:    The nose is normal    Pinnae are normal    External acoustic canals are normal  Neck:  Trachea midline  CV:  Pulses are normal.    Resp:  No respiratory distress   Abdomen:      Soft, non-tender, non-distended  Musc:  Normal muscular tone    No major joint effusions    No asymmetric leg swelling  Skin:  Superficial linear lacerations to dorsal aspect of right arm and volar aspect of left arm, neurovascularly intact distal to injury    Neuro:  Speech is normal and fluent. Face is symmetric.     Moving all extremities well.   Psych: Awake.Labile affect. Intermittently aggressive.     Emergency Department Course   ECG (23:09:31):  Rate 99 bpm. MA interval 138. QRS duration 80. QT/QTc 326/418. P-R-T axes 71 75 37. Normal sinus rhythm. Normal ECG.  Interpreted at 2314 by Alyssia Garcia MD.     Laboratory:  Acetaminophen Level: <2  BMP: Glucose 112 (H), o/w WNL (Creatinine 0.94)  CBC: WNL (WBC 8.7, HGB 13.7, )     Emergency Department Course:  2230: Marilyn from DEC assessed the patient.    Past medical records, nursing notes, and vitals reviewed.  2250: I performed an exam of the patient and obtained history, as documented above.  IV inserted and blood drawn.  EKG obtained, results above.    0440: I rechecked the patient. Explained findings to the patient.     Findings and plan explained to the Patient.    Patient will be transferred to [place] via EMS. Discussed the case with Kisha, who will admit the patient to a monitored bed for further monitoring, evaluation, and treatment.     Impression & Plan    Medical  Decision Making:  Kendell Prasad is a 27 year old male with a history of anxiety, depression, and substance abuse who presents with concerns for suicidal ideations. Patient clearly attempted to harm himself with cutting on his arms and admits to heroin use today and has been using more regularly. Patient is cooperative when I first talked to him. He denies any other attempts to hurt himself. DEC assessed him and thought he should be admitted to [place] for inpatient psychiatric hospitalization. I did medically clear him with overdose lab work which was unremarkable. In terms of his cuts they were all very superficial and did not require any repair and his tetanus is up to date. Patient was initially reasonably comfortable with going to [place]. He did not want to but was comfortable with this. When the ambulance arrived, he actually started to become very agitated with me and the staff here. He stated that we did not clean his wounds; however, they had been cleaned. He said he would rather be arrested, and when I informed him that wasn't an option he got very upset. He called his mother and was yelling and cursing. I suspect that he was angry that he was going to be admitted and is now wanting drugs. However, with his psychiatric history, not being on medications, his attempts to hurt himself and with DEC thinking he needs to be admitted, I do not think he is safe to be discharged so patient was maintained on the hold and transferred to [place]. He finally was amenable to going and did not need medication. Patient transferred in stable condition.    Diagnosis:    ICD-10-CM   1. Suicidal ideation R45.851   2. Substance abuse F19.10       Disposition:  Transferred to [place]    Yesy Maza  2/5/2018   [facility] EMERGENCY DEPARTMENT    FELICITAS, Yesy Maza, am serving as a scribe at 10:50 PM on 2/5/2018 to document services personally performed by Alyssia Garcia MD based on my  observations and the provider's statements to me.        Alyssia Garcia MD  02/07/18 0043       Alyssia Garcia MD  02/07/18 0043

## 2018-02-06 NOTE — ED NOTES
When EMS arrived pt was very upset that he was being transferred to [place]. Pt was yelling at the doctor and his mother on the phone. After he told his mother he needed to get out of the hospital so he can go and use drugs again he hung up on her. And then told Security he was willing to get on cart and go to [place]. One second patient would be angry and yelling and with some verbal deesculation patient could be calm and willing to follow commands. This is the first time patient has had any behavioral issues while arriving the Emergency Department. Pt states his wounds never got cleaned however they were during the beginning of his ER stay.

## 2018-02-06 NOTE — PROGRESS NOTES
"  INITIAL PSYCHOSOCIAL ASSESSMENT AND NOTE  I have reviewed the chart met with the patient, and developed Care Plan.  Information for assessment was obtained from: review of records.    PRESENTING PROBLEM: Per notes: \"26 yo cau male admitted voluntarily from [facility] secondary to suicidal ideation with cutting on inner aspect of both forearms with an exacto knife.  States he has been using Heroin on a daily basis for the past six months.  Did have a period of sobriety following treatment at [facility] which he estimates at 18 months.  Psychosocial stressors include the death of two friends and losing custody of his children to his \"Ex.\"  Released from senior living on December 6th.  Had been in senior living for six days.  Has another court date in March.\"  The following areas have been assessed:  History of Mental Health and Chemical Dependency: hx dx of depression and substance use; pt reports he had 18 months of sobriety recently, up until he relapsed approx 6 months ago after the death of his friend  Per notes: Rodolfo Bridges [facility]  1/10/2018    Cancel all future mixed IOP CD Phase I appointments for client at [facility].  Please arrange to cancel all future mixed IOP CD Phase I appointments for the above client at the [facility] effective immediately. Client is DC'd from program.   Living Situation: \"I live with my grandparents\"  Significant Life Events (Illness, Abuse, Trauma, Death): his children live out of state in [place] and pt is not in contact with them.  He reports this as very stressful.   Family Description (Constellation, Family Psychiatric History): Pt has two children ages four and five who live out of state with their mother.    Financial Status: unemployed with no source of income.  He was unable to report the last time he was employed.   Occupational History: no response  Educational Background: high school graduate     Service History: none  Legal Issues: Current legal. " "  Ethnic/Cultural Issues:  male  Spiritual Orientation: Denominational bill  Social Functioning (organizations, interests): \"I like to work\" was how pt answered this question.      Current Treatment providers:   none  Social Service Assessment/Plan:   Pt will receive  psychiatric services.  CTC to arrange clinically appropriate discharge plan.  Pt appears to need detox assistance and a Chemical Health assessment will be arranged.      "

## 2018-02-06 NOTE — PROGRESS NOTES
"RN ADMISSION NOTE:  26 yo cau male admitted voluntarily from [facility] secondary to suicidal ideation with cutting on inner aspect of both forearms with an exacto knife.  States he has been using Heroin on a daily basis for the past six months.  Did have a period of sobriety following treatment at [facility] which he estimates at 18 months.  Psychosocial stressors include the death of two friends and losing custody of his children to his \"Ex.\"  Released from [facility] on December 6th.  Had been in [facility] for six days.  Has another court date in March.  "

## 2018-02-06 NOTE — PLAN OF CARE
Problem: General Plan of Care (Inpatient Behavioral)  Goal: Team Discussion  Team Plan:   BEHAVIORAL TEAM DISCUSSION    Participants: Anya Lemos [facility]  Progress: new admission.  Pt is a 28 yo  male admitted voluntarily from [facility] ER secondary to suicidal ideation with cutting on inner aspect of both forearms with an exacto knife.  States he has been using Heroin on a daily basis for the past six months  Continued Stay Criteria/Rationale: needs further assessment   Medical/Physical: see chart  Precautions:   Behavioral Orders   Procedures     Code 1 - Restrict to Unit     Latex precautions     Routine Programming     As clinically indicated     Self Injury Precaution     Status 15     Every 15 minutes.     Withdrawal precautions     Plan: assess, monitor and stabilize  Rationale for change in precautions or plan: new admission

## 2018-02-06 NOTE — IP AVS SNAPSHOT
[address] [place] [address]    Phone:  [phone]                                       After Visit Summary   2/6/2018    Kendell Prasad    MRN: [id number]           After Visit Summary Signature Page     I have received my discharge instructions, and my questions have been answered. I have discussed any challenges I see with this plan with the nurse or doctor.    ..........................................................................................................................................  Patient/Patient Representative Signature      ..........................................................................................................................................  Patient Representative Print Name and Relationship to Patient    ..................................................               ................................................  Date                                            Time    ..........................................................................................................................................  Reviewed by Signature/Title    ...................................................              ..............................................  Date                                                            Time

## 2018-02-06 NOTE — ED NOTES
Agree with triage note. No acute distress noted. Mother at bedside. Pt resting on cart. Pt is calm and cooperative. Will cont to monitor.

## 2018-02-06 NOTE — PROGRESS NOTES
Patient OWS  @ 1300 (monitored r/t patient reported daily heroin use). Patient has remained in room sleeping, easily roused, this shift. No behaviors.    /68  Pulse 77  Temp 98.5  F (36.9  C)  Resp 16  Ht 1.829 m (6')  Wt 69.9 kg (154 lb)  BMI 20.89 kg/m2. No complaints.    Nursing will continue to monitor.

## 2018-02-06 NOTE — PROGRESS NOTES
"CLINICAL NUTRITION SERVICES - ASSESSMENT NOTE     Nutrition Prescription    RECOMMENDATIONS FOR MDs/PROVIDERS TO ORDER:  None today    Malnutrition Status:    Unable to determine due to incomplete nutrition status validation.    Recommendations already ordered by Registered Dietitian (RD):  -None today    Future/Additional Recommendations:  -Follow up with pt when available to check po intakes and obtain nutrition hx.  -Monitor po intakes and weight trends.  If po intakes decreased, consider ordering nutrition supplement such as Ensure Plus with meals.  -Document % intakes at meals in chart to aid in nutrition assessment.     REASON FOR ASSESSMENT  Kendell Prasad is a/an 27 year old male assessed by the dietitian for Admission Nutrition Risk Screen for unintentional loss of 10# or more in the past two months    NUTRITION HISTORY  Per chart review, admitted voluntarily from [facility] ER secondary to suicidal ideation with cutting on inner aspect of both forearms with an exacto knife.  States he has been using Heroin on a daily basis for the past six months.     Unable to obtain nutrition hx today due to pt sleeping at time of RD visit.    CURRENT NUTRITION ORDERS  Diet: Regular  Intake/Tolerance:  Per staff, pt has been sleeping this am and so has not eaten so far today. Unable to wake pt for interview.    LABS  Labs reviewed    MEDICATIONS  Medications reviewed    ANTHROPOMETRICS  Height: 182.9 cm (6' 0\")  Most Recent Weight: 69.9 kg (154 lb)    IBW:  178 lbs  BMI: Normal BMI  Weight History:   Per chart review, no recent weight hx since December 2016.  Weight at OSH (8/1/17)  150 lbs.  Pt is currently @ 87% IBW.  Wt Readings from Last 10 Encounters:   02/06/18 69.9 kg (154 lb)   02/05/18 70 kg (154 lb 5.2 oz)   12/16/16 73.8 kg (162 lb 11.2 oz)   12/06/16 74.8 kg (165 lb)   04/14/15 72.6 kg (160 lb)   04/13/15 72.6 kg (160 lb)   03/30/15 72.6 kg (160 lb)   03/01/15 68 kg (150 lb)   02/16/14 71.7 kg (158 lb) "   09/10/13 63.5 kg (140 lb)       Dosing Weight: 70 kg  (current weight)    ASSESSED NUTRITION NEEDS  Estimated Energy Needs: 8163-8946 kcals/day (30 - 35 kcals/kg )  Justification: Repletion  Estimated Protein Needs: 70-84 grams protein/day (1 - 1.2 grams of pro/kg)  Justification: Repletion/wound healing  Estimated Fluid Needs: (1 mL/kcal)   Justification: Per provider pending fluid status    PHYSICAL FINDINGS  See malnutrition section below.    -cutting on inner aspect of both forearms with an exacto knife.      MALNUTRITION  % Intake: Unable to assess  % Weight Loss: Unable to assess  Subcutaneous Fat Loss: Unable to assess  Muscle Loss: Unable to assess  Fluid Accumulation/Edema: None noted  Malnutrition Diagnosis: Unable to determine due to incomplete nutrition status validation (pt sleeping at time of RD visit).    NUTRITION DIAGNOSIS  Predicted suboptimal nutrient intake related to presume decreased intakes due to mental health status and substance use as evidenced by chart review and (+) admission nutrition risk screen for unintended weight loss.    INTERVENTIONS  Implementation  Nutrition Education: Unable to complete due to pt not available.  Asked unit staff about intakes so far and requested documenting po in chart to aid in nutrition assessment.     Goals  Patient to consume % of nutritionally adequate meal trays TID, or the equivalent with supplements/snacks.     Monitoring/Evaluation  Progress toward goals will be monitored and evaluated per protocol.    Urszula Scott RD, JOHNATHON

## 2018-02-06 NOTE — H&P
"[facility], [place]   Psychiatric History & Physical  Admission date: 2/6/2018        Chief Complaint:   \"Just want help for my chemical dependency\"         HPI:     Kendell is a 27 year old male with history of opiate and meth use disorder who was admitted on a voluntary basis from [facility] ED for self injurious behaviors (superfical cuts to forearms B/L) with recent (and prevalent) heroin and meth use. Reports from [facility] indicate that the patient has had ongoing IV heroin use (daily for the past 6 months). He was reported to be very labile in his emotions, vacillating from angry to calm/redirectable. He also was heard threatening his mother that he would use drugs if he leaves the hospital. When he was seen in the hospital today, he was laying in bed for most of the day, but was open to speak with me when I had awakened him. He mentions that he uses IV heroin every day for the past 6 months. He injects in both arms, and denies using any dirty needles, or sharing needles. His last use was around 8:23 PM yesterday (less than 24 hours ago). When asked about other medications, he mentions \"Just just Heroin and Meth that's what I do. He did not elaborate as to how much meth he does, and told that his heroin use has been the main issue. He endorse some depressive symptoms, including low moods, hopelessness, worthlessness, guilt, sleep problems, and passive SI without plan or intent. He mentions that he superficially cut himself with an xacto knife to feel the pain. He denies any current thoughts about self harm. He initially told me he was thinking about going, but later mentioned that he would stay for treatment. He used to go to MI/CD program, but missed 7 out of 11 session, which he attributed to \"transportation issue.\" He mentioned that if transportation issues weren't a problem, he would be willing to go to CD treatment.         Past Psychiatric History:   Past inpatient treatment: Denies "   Current outpatient psychiatrist: None. Has PCP with Dr. Karine Toscano at [facility] on 4/2017.   Current outpatient therapist: Denies   Other (ACT team etc): None   Past suicide attempts: Denies   History of commitments: Denies   History of ECT: Denies         Substance Use and History:   Prevalent meth and heroin. Denies other drugs. Utox not done. Reports indicate that he has a year sobriety when he was in [facility] for CD treatment.         Past Medical History:   PAST MEDICAL HISTORY:   Past Medical History:   Diagnosis Date     Arthritis      Depression      H/O suicide attempt 9/2010    medication overdose     Ulcer (H)        PAST SURGICAL HISTORY:   Past Surgical History:   Procedure Laterality Date     APPENDECTOMY       Exploration of complex left index finger laceration with  8/2005     surgery for lazy eye       TONSILLECTOMY               Family History:   FAMILY HISTORY:   Family History   Problem Relation Age of Onset     Depression Mother      Anxiety Disorder Mother      Bipolar Disorder Mother      Unknown/Adopted Father      Depression Maternal Grandmother      Anxiety Disorder Maternal Grandmother      MENTAL ILLNESS Maternal Grandmother      Depression Maternal Grandfather      Substance Abuse Maternal Grandfather      Unknown/Adopted Paternal Grandmother      Substance Abuse Paternal Grandmother      Depression Brother      Anxiety Disorder Brother      Substance Abuse Brother      MENTAL ILLNESS Brother      Autism Spectrum Disorder Sister      Substance Abuse Sister      Schizophrenia No family hx of      Suicide No family hx of      Dementia No family hx of      Emmet Disease No family hx of      Parkinsonism No family hx of      Intellectual Disability (Mental Retardation) No family hx of            Social History:   SOCIAL HISTORY:   Social History   Substance Use Topics     Smoking status: Current Every Day Smoker     Packs/day: 0.50     Smokeless tobacco: Never  Used     Alcohol use Yes      Comment: occ            Physical ROS:   The patient endorsed anxiety, and restlessness. The remainder of 10-point review of systems was negative except as noted in HPI.         PTA Medications:     Prescriptions Prior to Admission   Medication Sig Dispense Refill Last Dose     PARoxetine HCl (PAXIL PO) Take by mouth daily   2/5/2018 at Unknown time     Azithromycin (ZITHROMAX PO)    More than a month at Unknown time     ibuprofen (ADVIL/MOTRIN) 200 MG tablet Take 3 tablets (600 mg) by mouth every 8 hours as needed for mild pain 30 tablet 0 More than a month at Unknown time     methocarbamol (ROBAXIN) 500 MG tablet Take 1 tablet (500 mg) by mouth 4 times daily as needed for muscle spasms 15 tablet 0 More than a month at Unknown time     BACLOFEN PO    More than a month at Unknown time     QUEtiapine Fumarate (SEROQUEL XR PO) Take 200 mg by mouth 2 times daily   More than a month at Unknown time          Allergies:     Allergies   Allergen Reactions     Clindamycin Hcl      Latex      Latex tape     Neosporin [Neomycin-Polymyx-Gramicid]      Penicillins Hives     Pertussis Vaccine      Cried for 36 hours     Vancomycin           Labs:     Recent Results (from the past 48 hour(s))   EKG 12 lead    Collection Time: 02/05/18 11:09 PM   Result Value Ref Range    Interpretation ECG Click View Image link to view waveform and result    Acetaminophen level    Collection Time: 02/05/18 11:21 PM   Result Value Ref Range    Acetaminophen Level <2 mg/L   Basic metabolic panel    Collection Time: 02/05/18 11:21 PM   Result Value Ref Range    Sodium 136 133 - 144 mmol/L    Potassium 3.5 3.4 - 5.3 mmol/L    Chloride 100 94 - 109 mmol/L    Carbon Dioxide 32 20 - 32 mmol/L    Anion Gap 4 3 - 14 mmol/L    Glucose 112 (H) 70 - 99 mg/dL    Urea Nitrogen 18 7 - 30 mg/dL    Creatinine 0.94 0.66 - 1.25 mg/dL    GFR Estimate >90 >60 mL/min/1.7m2    GFR Estimate If Black >90 >60 mL/min/1.7m2    Calcium 8.7 8.5 -  "10.1 mg/dL   CBC with platelets differential    Collection Time: 02/05/18 11:21 PM   Result Value Ref Range    WBC 8.7 4.0 - 11.0 10e9/L    RBC Count 4.80 4.4 - 5.9 10e12/L    Hemoglobin 13.7 13.3 - 17.7 g/dL    Hematocrit 40.3 40.0 - 53.0 %    MCV 84 78 - 100 fl    MCH 28.5 26.5 - 33.0 pg    MCHC 34.0 31.5 - 36.5 g/dL    RDW 12.2 10.0 - 15.0 %    Platelet Count 287 150 - 450 10e9/L    Diff Method Automated Method     % Neutrophils 59.7 %    % Lymphocytes 26.7 %    % Monocytes 10.5 %    % Eosinophils 2.0 %    % Basophils 0.6 %    % Immature Granulocytes 0.5 %    Nucleated RBCs 0 0 /100    Absolute Neutrophil 5.2 1.6 - 8.3 10e9/L    Absolute Lymphocytes 2.3 0.8 - 5.3 10e9/L    Absolute Monocytes 0.9 0.0 - 1.3 10e9/L    Absolute Eosinophils 0.2 0.0 - 0.7 10e9/L    Absolute Basophils 0.1 0.0 - 0.2 10e9/L    Abs Immature Granulocytes 0.0 0 - 0.4 10e9/L    Absolute Nucleated RBC 0.0           Physical and Psychiatric Examination:     /68  Pulse 77  Temp 98.5  F (36.9  C)  Resp 16  Ht 1.829 m (6')  Wt 69.9 kg (154 lb)  BMI 20.89 kg/m2  Weight is 154 lbs 0 oz  Body mass index is 20.89 kg/(m^2).    Physical Exam:  I have reviewed the physical exam as documented by ED provider agree with findings and assessment and have no additional findings to add at this time.    Mental Status Exam:  Appearance:  male, very disheveled, poor hygiene. Forearms/arms covered in tattoos.   Attitude:  Irritable, dismissive  Eye Contact:  Poor   Mood:  Irritable, \"Whatever\"   Affect:  Subdued and shows some exaggerated range    Speech: Rapid in rate, regular in rhythm.   Language: fluent and intact in English  Psychomotor, Gait, Musculoskeletal:  Some restlessness. Otherwise unremarkable   Throught Process: Linear  Associations:  Intact   Thought Content: No current SI/HI/AH/VH.   Insight:  Poor   Judgement:  Poor   Oriented to:  Person, place, time   Attention Span and Concentration: Average   Recent and Remote Memory:  " Intact   Fund of Knowledge: Below Average          Admission Diagnoses:   Unspecified Mood Disorder vs. Major Depressive Disorder   Opiate Use Disorder  Amphetamine Use Disorder             Assessment & Plan:     Assessment:  Kendell appears to have prevalent IV heroin use (along with meth use), and has had worsening depressive symptoms with recent self injurious behavior. As per reports, he's had multiple psychosocial factors that could precipitate/perpetuate his use including death of two friends, and loosing his kids to his ex. He also has pending court dates later in the year. He has not been taking any antidepressant medications, but is open to starting on medications that claimed helped him such as Seroquel. Regarding his drug use, his last use of heroin was appoximately yesterday at 8:23 PM. His most recent vitals were unremarkable. He did seem somewhat restless during interview today, therefor he could possibly be starting to go through relapse symptoms. I will add Suboxone, and Opiate Withdrawal scale, and have Suboxone given when he starts to exhibit withdrawal scores of 8 or higher, or around 8:30 PM (which would be 24 hrs post use), whichever comes first.     Plan:  - Seroquel 200 mg Daily   - Ativan 0.5 mg once  - Suboxone 4 mg (given daily, starting when COWs score is 8 or greater, or around 8:30 PM, whichever comes first).   - Trazodone 50 mg   - CD assessment/Rule 25.     Legal:  Voluntary     Disposition:  Unclear, likely CD tx facility in 1-2 weeks.        Juan Stokes MD  [facility] Psychiatry      Spent  45  minutes on encounter, >50% of which was spent in counseling and/or coordination of care, consisting of taking history, reviewing system, and discussing medication and side effects

## 2018-02-06 NOTE — IP AVS SNAPSHOT
MRN:[id number]                      After Visit Summary   2/6/2018    Kendell Prasad    MRN: [id number]           Thank you!     Thank you for choosing [facility] for your care. Our goal is always to provide you with excellent care.        Patient Information     Date Of Birth          1990        Designated Caregiver       Most Recent Value    Caregiver    Will someone help with your care after discharge? yes    Name of designated caregiver see note     Phone number of caregiver see note     Caregiver address see note       About your hospital stay     You were admitted on:  February 6, 2018 You last received care in the:  [facility]    You were discharged on:  February 12, 2018       Who to Call     For medical emergencies, please call 911.  For non-urgent questions about your medical care, please call your primary care provider or clinic, None          Attending Provider     Provider Specialty    Juan Stokes MD Psychiatry       Primary Care Provider Fax #    Physician No Ref-Primary [phone]      Further instructions from your care team        Behavioral Discharge Planning and Instructions    Summary:  You were admitted on 2/6/2018  due to Depression, Suicidal Ideations, Self Injurious Behaviors and Chemical Use Issues.  You were treated by Dr Juan Stokes MD and discharged on 2/12/2018 [facility]      Principal Diagnosis:     Unspecified Mood Disorder vs. Major Depressive Disorder   Opiate Use Disorder  Amphetamine Use Disorder      Health Care Follow-up Appointments:     You had a chemical health evaluation with Mary Torres ([facility] chemical health ) on Saturday 2/10/18.  You can reach her at [phone].  She recommended a residential program but you are requesting an outpatient program and to follow up with suboxone.  Because you insisted on being discharged today, we were unable to provide final referral appointments, etc.  Mary  reported that you can consider getting suboxone treatment through [facility] in [place].      Should you decide to attend psychiatry or therapy you can schedule with:  [facility]  [address] [facility] [address]   Phone: [phone]      Mental Health Crisis Program provides crisis intervention to residents of [facility]. This service is available 24 hours a day, 7 days a week. The Crisis Program provides telephone crisis intervention and mobile on-site response to assess and stabilize immediate crisis.  For more information, please call [phone]    [facility]  Mental health service in [place], [place]  Address: [address] [place] [address]  Phone: [phone]      If no appointments scheduled, explain: pt refused.  He states he will follow up by getting his own appointments.    Attend all scheduled appointments with your outpatient providers. Call at least 24 hours in advance if you need to reschedule an appointment to ensure continued access to your outpatient providers.   Major Treatments, Procedures and Findings:  You were provided with: a psychiatric assessment, assessed for medical stability, medication evaluation and/or management, milieu management and detox.    Symptoms to Report: losing more sleep, mood getting worse, thoughts of suicide or relapse.    Early warning signs can include: increased depression or anxiety sleep disturbances increased thoughts or behaviors of suicide or self-harm and substance use.      Safety and Wellness:  Take all medicines as directed.  Make no changes unless your doctor suggests them. Follow treatment recommendations.  Refrain from alcohol and non-prescribed drugs.  If there is a concern for safety, call 221.    Resources:   Crisis Intervention: 261.529.5819 or 826-907-7404 (TTY: 857.138.9567).  Call anytime for help.  National [place] on Mental Illness (www.[place].nandini.org): 414.827.4537  "or 188-139-8668.  Alcoholics Anonymous (www.alcoholics-anonymous.org): Check your phone book for your local chapter.  Suicide Awareness Voices of Education (SAVE) (www.save.org): 772-900-RKLR (6392)  National Suicide Prevention Line (www.mentalhealthmn.org): 164-639-MHXQ (0684)  [facility] Crisis Response 752-503-7820  Crisis text line: Text \"START\" to 897-899. Free, confidential, 24/7.    The treatment team has appreciated the opportunity to work with you.     If you have any questions or concerns our unit number is [phone].  You may be receiving a follow-up phone call within the next three days from a representative from behavioral health.    You have identified the best phone number to reach you as [phone]          Pending Results     No orders found from 2/4/2018 to 2/7/2018.            Admission Information     Date & Time Provider Department Dept. Phone    2/6/2018 Juan Stokes MD [facility] [phone]      Your Vitals Were     Blood Pressure Pulse Temperature Respirations Height Weight    114/76 96 98  F (36.7  C) (Oral) 16 1.829 m (6') 69.9 kg (154 lb)    BMI (Body Mass Index)                   20.89 kg/m2           MyChart Information     [facility] gives you secure access to your electronic health record. If you see a primary care provider, you can also send messages to your care team and make appointments. If you have questions, please call your primary care clinic.  If you do not have a primary care provider, please call [phone] and they will assist you.        Care EveryWhere ID     This is your Care EveryWhere ID. This could be used by other organizations to access your [place] medical records  [id number]        Equal Access to Services     SENAIT WATKINS AH: Sal Lau, lex morales, mono dalton. So [facility] [phone].    ATENCIÓN: Si habla español, tiene a gillis disposición servicios gratuitos de " asistencia lingüística. Ursula al [phone].    We comply with applicable federal civil rights laws and [place] laws. We do not discriminate on the basis of race, color, national origin, age, disability, sex, sexual orientation, or gender identity.               Review of your medicines      START taking        Dose / Directions    cloNIDine 0.1 MG tablet   Commonly known as:  CATAPRES        Dose:  0.1 mg   Take 1 tablet (0.1 mg) by mouth 2 times daily as needed (for anxiety, agitation)   Quantity:  60 tablet   Refills:  1       hydrOXYzine 25 MG tablet   Commonly known as:  ATARAX        Dose:  50 mg   Take 2 tablets (50 mg) by mouth every 4 hours as needed for anxiety   Quantity:  60 tablet   Refills:  1       OLANZapine 10 MG tablet   Commonly known as:  zyPREXA        Dose:  10 mg   Take 1 tablet (10 mg) by mouth every 2 hours as needed (anxiety, depressed moods.)   Quantity:  30 tablet   Refills:  1       * QUEtiapine 100 MG tablet   Commonly known as:  SEROquel   Replaces:  SEROQUEL XR PO        Dose:  100 mg   Take 1 tablet (100 mg) by mouth At Bedtime   Quantity:  30 tablet   Refills:  1       * QUEtiapine 200 MG tablet   Commonly known as:  SEROquel        Dose:  200 mg   Start taking on:  2/13/2018   Take 1 tablet (200 mg) by mouth daily   Quantity:  30 tablet   Refills:  1       traZODone 50 MG tablet   Commonly known as:  DESYREL        Dose:  50 mg   Take 1 tablet (50 mg) by mouth At Bedtime   Quantity:  30 tablet   Refills:  1       * Notice:  This list has 2 medication(s) that are the same as other medications prescribed for you. Read the directions carefully, and ask your doctor or other care provider to review them with you.      CONTINUE these medicines which have NOT CHANGED        Dose / Directions    ibuprofen 200 MG tablet   Commonly known as:  ADVIL/MOTRIN        Dose:  600 mg   Take 3 tablets (600 mg) by mouth every 8 hours as needed for mild pain   Quantity:  30 tablet   Refills:  0          STOP taking     BACLOFEN PO           methocarbamol 500 MG tablet   Commonly known as:  ROBAXIN           PAXIL PO           SEROQUEL XR PO   Replaced by:  QUEtiapine 100 MG tablet           ZITHROMAX PO                Where to get your medicines      These medications were sent to [id number] - [place], [place] - [address] [address]  [address] [address], [place] [address]     Phone:  [phone]     cloNIDine 0.1 MG tablet    hydrOXYzine 25 MG tablet    OLANZapine 10 MG tablet    QUEtiapine 100 MG tablet    QUEtiapine 200 MG tablet    traZODone 50 MG tablet                Protect others around you: Learn how to safely use, store and throw away your medicines at www.disposemymeds.org.             Medication List: This is a list of all your medications and when to take them. Check marks below indicate your daily home schedule. Keep this list as a reference.      Medications           Morning Afternoon Evening Bedtime As Needed    cloNIDine 0.1 MG tablet   Commonly known as:  CATAPRES   Take 1 tablet (0.1 mg) by mouth 2 times daily as needed (for anxiety, agitation)   Last time this was given:  0.1 mg on 2/10/2018  5:56 PM                                hydrOXYzine 25 MG tablet   Commonly known as:  ATARAX   Take 2 tablets (50 mg) by mouth every 4 hours as needed for anxiety   Last time this was given:  50 mg on 2/12/2018  2:58 AM                                ibuprofen 200 MG tablet   Commonly known as:  ADVIL/MOTRIN   Take 3 tablets (600 mg) by mouth every 8 hours as needed for mild pain                                OLANZapine 10 MG tablet   Commonly known as:  zyPREXA   Take 1 tablet (10 mg) by mouth every 2 hours as needed (anxiety, depressed moods.)   Last time this was given:  10 mg on 2/12/2018  2:58 AM                                * QUEtiapine 100 MG tablet   Commonly known as:  SEROquel   Take 1 tablet (100 mg) by mouth At Bedtime   Last time this was given:  200 mg on 2/12/2018  8:09 AM                                 * QUEtiapine 200 MG tablet   Commonly known as:  SEROquel   Take 1 tablet (200 mg) by mouth daily   Start taking on:  2/13/2018   Last time this was given:  200 mg on 2/12/2018  8:09 AM                                traZODone 50 MG tablet   Commonly known as:  DESYREL   Take 1 tablet (50 mg) by mouth At Bedtime   Last time this was given:  50 mg on 2/11/2018  8:12 PM                                * Notice:  This list has 2 medication(s) that are the same as other medications prescribed for you. Read the directions carefully, and ask your doctor or other care provider to review them with you.

## 2018-02-06 NOTE — PROGRESS NOTES
02/06/18 0532   Patient Belongings   Did you bring any home meds/supplements to the hospital?  No   Patient Belongings clothing;cell phone/electronics   Disposition of Belongings Locker   Belongings Search Yes   Clothing Search Yes   Second Staff Shira PA   General Info Comment patient came in with a green note book, figit spiner, head phone, black slipper/shoe, black pant, grey short, grey shirt, grey winter hat, sock, little stuff cow, a black back pack full of clothing SEARCHED, a blue hygiene bag full of razors and tools for shaving, camo  back pack full of clothing , hygiene products, crafts, and wallet all SEARCHED.   NO money in patient belongings.   A               Admission:  I am responsible for any personal items that are not sent to the safe or pharmacy.  [facility] is not responsible for loss, theft or damage of any property in my possession.    Signature:  _________________________________ Date: _______  Time: _____                                              Staff Signature:  ____________________________ Date: ________  Time: _____      2nd Staff person, if patient is unable/unwilling to sign:    Signature: ________________________________ Date: ________  Time: _____     Discharge:  [facility] has returned all of my personal belongings:    Signature: _________________________________ Date: ________  Time: _____                                          Staff Signature:  ____________________________ Date: ________  Time: _____

## 2018-02-06 NOTE — ED NOTES
Bed: ED07  Expected date:   Expected time:   Means of arrival:   Comments:  Hold for beh pt in triage

## 2018-02-06 NOTE — PROGRESS NOTES
Placed call to Mary Torres ([facility] chemical health ) at [phone]  She can see pt this Saturday at 10am and may be able to come in sooner but will call if that is the case

## 2018-02-06 NOTE — ED NOTES
Pt with cutting injuries to TONIA arms w/exacto knife in attempt to hurt self.  Also used heroin 2hrs PTA.  Here with mother, wants help.

## 2018-02-07 PROCEDURE — 25000132 ZZH RX MED GY IP 250 OP 250 PS 637: Performed by: PSYCHIATRY & NEUROLOGY

## 2018-02-07 PROCEDURE — 12400001 ZZH R&B MH UMMC

## 2018-02-07 RX ADMIN — QUETIAPINE FUMARATE 200 MG: 200 TABLET ORAL at 09:05

## 2018-02-07 RX ADMIN — OLANZAPINE 10 MG: 10 TABLET, FILM COATED ORAL at 18:15

## 2018-02-07 RX ADMIN — BUPRENORPHINE HYDROCHLORIDE, NALOXONE HYDROCHLORIDE 1 FILM: 4; 1 FILM, SOLUBLE BUCCAL; SUBLINGUAL at 09:05

## 2018-02-07 ASSESSMENT — ACTIVITIES OF DAILY LIVING (ADL)
GROOMING: INDEPENDENT
DRESS: SCRUBS (BEHAVIORAL HEALTH);INDEPENDENT
LAUNDRY: WITH SUPERVISION
ORAL_HYGIENE: INDEPENDENT
DRESS: INDEPENDENT
ORAL_HYGIENE: INDEPENDENT
GROOMING: HANDWASHING;INDEPENDENT

## 2018-02-07 NOTE — PROGRESS NOTES
Bupe started at OWS = 8.     Patient ate small amount, napped and fluids adequate.    /62  Pulse 94  Temp 98.9  F (37.2  C) (Oral)  Resp 16  Ht 1.829 m (6')  Wt 69.9 kg (154 lb)  BMI 20.89 kg/m2. Patient reports feeling sweaty, irritable and stomach cramps.    Nursing will continue to monitor.

## 2018-02-07 NOTE — PROGRESS NOTES
Pt observed staying in room most of the shift.  Pt did not  Attend groups, ate meals, and no socialization with others.  Pt cooperative and pleasant.       02/07/18 1502   Behavioral Health   Hallucinations denies / not responding to hallucinations   Thinking distractable;poor concentration   Orientation person: oriented;place: oriented;date: oriented;time: oriented   Memory baseline memory   Insight poor   Judgement impaired   Eye Contact at examiner   Affect blunted, flat   Mood anxious   Physical Appearance/Attire attire appropriate to age and situation   Hygiene well groomed   Suicidality other (see comments)  (none stated)   1. Wish to be Dead No   2. Non-Specific Active Suicidal Thoughts  No   Activity isolative;withdrawn   Speech clear;coherent   Psychomotor / Gait balanced;steady   Sleep/Rest/Relaxation   Day/Evening Time Hours napping;resting in bed   Coping/Psychosocial   Verbalized Emotional State acceptance;anxiety;depression   Plan Of Care Reviewed With patient   Psycho Education   Type of Intervention 1:1 intervention   Response unavailable   Activities of Daily Living   Hygiene/Grooming handwashing;independent   Oral Hygiene independent   Dress scrubs (behavioral health);independent   Activity   Activity Assistance Provided independent   Behavioral Health Interventions   Depression provide emotional support;assist with developing and utilizing healthy coping strategies;assess patient response to medication;assess & implement appropriate substance withdrawal protocol   Social and Therapeutic Interventions (Depression) encourage participation in therapeutic groups and milieu activities

## 2018-02-08 PROCEDURE — 99232 SBSQ HOSP IP/OBS MODERATE 35: CPT | Performed by: PSYCHIATRY & NEUROLOGY

## 2018-02-08 PROCEDURE — 25000132 ZZH RX MED GY IP 250 OP 250 PS 637: Performed by: PSYCHIATRY & NEUROLOGY

## 2018-02-08 PROCEDURE — 12400001 ZZH R&B MH UMMC

## 2018-02-08 PROCEDURE — 99207 ZZC CDG-CHARGE REQUIRED MANUAL ENTRY: CPT | Performed by: PSYCHIATRY & NEUROLOGY

## 2018-02-08 RX ORDER — LORAZEPAM 0.5 MG/1
0.5 TABLET ORAL EVERY 4 HOURS PRN
Status: COMPLETED | OUTPATIENT
Start: 2018-02-08 | End: 2018-02-10

## 2018-02-08 RX ORDER — QUETIAPINE FUMARATE 100 MG/1
100 TABLET, FILM COATED ORAL AT BEDTIME
Status: DISCONTINUED | OUTPATIENT
Start: 2018-02-08 | End: 2018-02-12 | Stop reason: HOSPADM

## 2018-02-08 RX ORDER — BUPRENORPHINE AND NALOXONE 8; 2 MG/1; MG/1
1 FILM, SOLUBLE BUCCAL; SUBLINGUAL DAILY
Status: DISCONTINUED | OUTPATIENT
Start: 2018-02-09 | End: 2018-02-09

## 2018-02-08 RX ORDER — BUPRENORPHINE AND NALOXONE 4; 1 MG/1; MG/1
1 FILM, SOLUBLE BUCCAL; SUBLINGUAL DAILY
Status: COMPLETED | OUTPATIENT
Start: 2018-02-08 | End: 2018-02-08

## 2018-02-08 RX ADMIN — BUPRENORPHINE HYDROCHLORIDE, NALOXONE HYDROCHLORIDE 1 FILM: 4; 1 FILM, SOLUBLE BUCCAL; SUBLINGUAL at 09:03

## 2018-02-08 RX ADMIN — QUETIAPINE FUMARATE 200 MG: 200 TABLET ORAL at 09:03

## 2018-02-08 RX ADMIN — BUPRENORPHINE HYDROCHLORIDE, NALOXONE HYDROCHLORIDE 1 FILM: 4; 1 FILM, SOLUBLE BUCCAL; SUBLINGUAL at 16:42

## 2018-02-08 RX ADMIN — TRAZODONE HYDROCHLORIDE 50 MG: 50 TABLET ORAL at 22:12

## 2018-02-08 RX ADMIN — QUETIAPINE FUMARATE 100 MG: 100 TABLET ORAL at 22:12

## 2018-02-08 RX ADMIN — OLANZAPINE 10 MG: 10 TABLET, FILM COATED ORAL at 16:41

## 2018-02-08 ASSESSMENT — ACTIVITIES OF DAILY LIVING (ADL)
GROOMING: INDEPENDENT
DRESS: INDEPENDENT
ORAL_HYGIENE: INDEPENDENT
LAUNDRY: WITH SUPERVISION
GROOMING: INDEPENDENT
DRESS: STREET CLOTHES
ORAL_HYGIENE: INDEPENDENT

## 2018-02-08 NOTE — PLAN OF CARE
"Problem: Patient Care Overview  Goal: Individualization & Mutuality  The patient completed the reasons for admit and goals for discharge in the personal plan of care.   Reasons for admit:  1)  \"stress, that's about it\"      Goals for discharge:  1)  detoxed  2)  CD eval (which he later declined)          "

## 2018-02-08 NOTE — PLAN OF CARE
Problem: Mood Impairment (Depressive Signs/Symptoms) (Adult)  Intervention: Promote Mood Improvement  Pt. affect appears blunted, mood flat.  Pt. accepts medications without incident.  Pt. engages with staff with minimal responses.

## 2018-02-09 PROCEDURE — 99231 SBSQ HOSP IP/OBS SF/LOW 25: CPT | Performed by: PSYCHIATRY & NEUROLOGY

## 2018-02-09 PROCEDURE — 25000132 ZZH RX MED GY IP 250 OP 250 PS 637: Performed by: PSYCHIATRY & NEUROLOGY

## 2018-02-09 PROCEDURE — 12400007 ZZH R&B MH INTERMEDIATE UMMC

## 2018-02-09 RX ORDER — BUPRENORPHINE AND NALOXONE 2; .5 MG/1; MG/1
1 FILM, SOLUBLE BUCCAL; SUBLINGUAL 2 TIMES DAILY
Status: DISCONTINUED | OUTPATIENT
Start: 2018-02-11 | End: 2018-02-12 | Stop reason: HOSPADM

## 2018-02-09 RX ORDER — BUPRENORPHINE AND NALOXONE 4; 1 MG/1; MG/1
1 FILM, SOLUBLE BUCCAL; SUBLINGUAL 2 TIMES DAILY
Status: COMPLETED | OUTPATIENT
Start: 2018-02-10 | End: 2018-02-10

## 2018-02-09 RX ADMIN — OLANZAPINE 10 MG: 10 TABLET, FILM COATED ORAL at 18:40

## 2018-02-09 RX ADMIN — BUPRENORPHINE HYDROCHLORIDE, NALOXONE HYDROCHLORIDE 1 FILM: 8; 2 FILM, SOLUBLE BUCCAL; SUBLINGUAL at 09:23

## 2018-02-09 RX ADMIN — QUETIAPINE FUMARATE 200 MG: 200 TABLET ORAL at 08:53

## 2018-02-09 RX ADMIN — QUETIAPINE FUMARATE 100 MG: 100 TABLET ORAL at 21:38

## 2018-02-09 RX ADMIN — TRAZODONE HYDROCHLORIDE 50 MG: 50 TABLET ORAL at 21:38

## 2018-02-09 ASSESSMENT — ACTIVITIES OF DAILY LIVING (ADL)
HYGIENE/GROOMING: INDEPENDENT
ORAL_HYGIENE: INDEPENDENT
LAUNDRY: UNABLE TO COMPLETE
DRESS: INDEPENDENT
DRESS: INDEPENDENT
ORAL_HYGIENE: INDEPENDENT
GROOMING: INDEPENDENT
LAUNDRY: UNABLE TO COMPLETE

## 2018-02-09 NOTE — PROGRESS NOTES
vm from Mary Velasquez with [facility], [phone]. Wants to confirm that pt will still be here on Saturday.     CTC called back. Left message that pt will be here.

## 2018-02-09 NOTE — PROGRESS NOTES
Pt was resting in bed/napping at the start of shift. On approach presents with blunted, flat affect,brief and superficial. Pt kept to self, spent most of shift watching TV out in milieu. Denies SI, SIB, HI and cooperates.        02/08/18 8749   Behavioral Health   Hallucinations denies / not responding to hallucinations   Thinking distractable   Orientation place: oriented;date: oriented;time: oriented;person: oriented   Memory baseline memory   Insight poor   Judgement impaired   Eye Contact at examiner   Affect blunted, flat   Mood mood is calm   Physical Appearance/Attire neat   Hygiene well groomed   Suicidality other (see comments)  (denies)   1. Wish to be Dead No   Wish to be Dead Description none   2. Non-Specific Active Suicidal Thoughts  No   Non-Specific Active Suicidal Thought Description none   Self Injury other (see comment)  (denies)   Elopement (none observed)   Activity other (see comment)  (visible out in milieu, kept to self)   Speech coherent;clear   Medication Sensitivity no observed side effects;no stated side effects   Psychomotor / Gait steady;balanced   Sleep/Rest/Relaxation   Day/Evening Time Hours resting in bed;napping   Number of hours napping 2 hours   Safety   Suicidality Status 15   Psycho Education   Type of Intervention 1:1 intervention   Response participates, initiates socially appropriate   Hours 0.5   Treatment Detail check in   Activities of Daily Living   Hygiene/Grooming independent   Oral Hygiene independent   Dress independent   Laundry with supervision   Room Organization independent   Activity   Activity Assistance Provided independent   Behavioral Health Interventions   Depression maintain safety precautions;monitor need to revise level of observation;assist patient in developing safety plan;maintain safe secure environment;provide emotional support;establish therapeutic relationship   Social and Therapeutic Interventions (Depression) encourage participation in  therapeutic groups and milieu activities;encourage effective boundaries with peers;encourage socialization with peers

## 2018-02-09 NOTE — PROGRESS NOTES
"Pt was isolative to room all shift, napping in his room. Reports mood as \"good, lazy\". Pt feels anxious being in an inpatient setting. Pt does not want any treatment, just suboxone, stating 'treatment won't help me at all, getting suboxone and a job will'. Pt denies SI/SIB.       02/09/18 1308   Behavioral Health   Hallucinations denies / not responding to hallucinations   Thinking poor concentration   Orientation place: oriented;person: oriented;date: oriented;time: oriented   Memory baseline memory   Insight poor   Judgement impaired   Eye Contact at examiner   Affect blunted, flat   Mood anxious;mood is calm   Physical Appearance/Attire disheveled   Hygiene well groomed   Suicidality other (see comments)  (denies)   1. Wish to be Dead No   2. Non-Specific Active Suicidal Thoughts  No   Self Injury other (see comment)  (none)   Activity isolative;withdrawn   Speech clear;coherent   Psychomotor / Gait balanced;steady   Psycho Education   Type of Intervention 1:1 intervention   Response participates, initiates socially appropriate   Hours 0.5   Treatment Detail check in   Activities of Daily Living   Hygiene/Grooming independent   Oral Hygiene independent   Dress independent   Laundry unable to complete   Room Organization independent     "

## 2018-02-09 NOTE — PROGRESS NOTES
"[facility], [place]   Psychiatric Progress Note        Interim History:   Reason for Hospitalization:Kendell is a 27 year old male with history of opiate and meth use disorder who was admitted on a voluntary basis from [facility] for self injurious behaviors (superfical cuts to forearms B/L) with recent (and prevalent) heroin and meth use.    Subjective: \"I want to fucking leave!\"    As per today's interview: Patient was seen in his room, laying on a haphazard mess of his bed, with his scrub pants almost falling off. He was irritable and mentioned that he wanted to leave. He did not want to be \"locked up\" in the hospital, and did not feel that he needed medications, CD treatment, or further inpatient stay. He wanted to go home, and will \"live with my friends or my grandparents.\" He mentioned that he would use again, and could not warrant staying safe. He became dismissive and went ahead and signed a 12 hour notice.          Medications:       QUEtiapine  100 mg Oral At Bedtime     [START ON 2/9/2018] buprenorphine HCl-naloxone HCl  1 Film Sublingual Daily     QUEtiapine  200 mg Oral Daily     traZODone  50 mg Oral At Bedtime          Allergies:     Allergies   Allergen Reactions     Clindamycin Hcl      Latex      Latex tape     Neosporin [Neomycin-Polymyx-Gramicid]      Penicillins Hives     Pertussis Vaccine      Cried for 36 hours     Vancomycin           Labs:   No results found for this or any previous visit (from the past 48 hour(s)).       Psychiatric Examination:   /66  Pulse 73  Temp 98.2  F (36.8  C) (Oral)  Resp 16  Ht 1.829 m (6')  Wt 69.9 kg (154 lb)  BMI 20.89 kg/m2  Weight is 154 lbs 0 oz  Body mass index is 20.89 kg/(m^2).    Appearance:  male, very disheveled, poor hygiene. Forearms/arms covered in tattoos.   Attitude:  Irritable, dismissive  Eye Contact:  Poor   Mood:  Irritable, \"Whatever\"   Affect:  Subdued and shows some exaggerated range  "   Speech: Rapid in rate, regular in rhythm.   Language: fluent and intact in English  Psychomotor, Gait, Musculoskeletal:  Some restlessness. Otherwise unremarkable   Throught Process: Linear  Associations:  Intact   Thought Content: No current SI/HI/AH/VH.   Insight:  Poor   Judgement:  Poor   Oriented to:  Person, place, time   Attention Span and Concentration: Average   Recent and Remote Memory:  Intact   Fund of Knowledge: Below Average       Assessment & Plan       Principal Diagnosis:   Unspecified Mood Disorder vs. Major Depressive Disorder   Opiate Use Disorder  Amphetamine Use Disorder    Assessment:   (Initial Assessment 2/7):   Kendell appears to have prevalent IV heroin use (along with meth use), and has had worsening depressive symptoms with recent self injurious behavior. As per reports, he's had multiple psychosocial factors that could precipitate/perpetuate his use including death of two friends, and loosing his kids to his ex. He also has pending court dates later in the year. He has not been taking any antidepressant medications, but is open to starting on medications that claimed helped him such as Seroquel. Regarding his drug use, his last use of heroin was appoximately yesterday at 8:23 PM. His most recent vitals were unremarkable. He did seem somewhat restless during interview today, therefor he could possibly be starting to go through relapse symptoms. I will add Suboxone, and Opiate Withdrawal scale, and have Suboxone given when he starts to exhibit withdrawal scores of 8 or higher, or around 8:30 PM (which would be 24 hrs post use), whichever comes first.     (Update 2/8): Patient signed 12 hour notice to leave. He has a history of daily Heroin use, and has been kicked out of his CD program in [place] (due to not attending), thus does not have a plan for CD treatment. Also, his mother expressed that she was quite worried about his imminent safety, and would quickly fall back into his  patter of IV drug use and self injury right away if he were no on medications and did not have a plan for CD treatment. The patient himself is quite irritable, slightly agitated, dismissive and visually looks quite disheveled, unkept, with poor hygiene. He is likely experiencing some cravings along with upwelling underlying depressive/anxiety coming to the surface. Due to these concerns for safety, I will place a 72 hour hold.      Plan:  - Seroquel 200 mg Daily and 100 mg HS  - Ativan 0.5 mg once  - Suboxone 8 mg   - Trazodone 50 mg   - CD assessment/Rule 25 on Saturday      Legal:  72 hour hold (expires 2/13 at 3:17 PM)      Disposition:  Unclear, likely CD tx facility in 1-2 weeks.          Juan Stokes MD  [facility]

## 2018-02-10 PROCEDURE — 25000132 ZZH RX MED GY IP 250 OP 250 PS 637: Performed by: PSYCHIATRY & NEUROLOGY

## 2018-02-10 PROCEDURE — 12400007 ZZH R&B MH INTERMEDIATE UMMC

## 2018-02-10 RX ORDER — CLONIDINE HYDROCHLORIDE 0.1 MG/1
0.1 TABLET ORAL 2 TIMES DAILY PRN
Status: DISCONTINUED | OUTPATIENT
Start: 2018-02-10 | End: 2018-02-12 | Stop reason: HOSPADM

## 2018-02-10 RX ADMIN — QUETIAPINE FUMARATE 100 MG: 100 TABLET ORAL at 19:19

## 2018-02-10 RX ADMIN — TRAZODONE HYDROCHLORIDE 50 MG: 50 TABLET ORAL at 19:19

## 2018-02-10 RX ADMIN — ACETAMINOPHEN 650 MG: 325 TABLET, FILM COATED ORAL at 07:59

## 2018-02-10 RX ADMIN — HYDROXYZINE HYDROCHLORIDE 50 MG: 25 TABLET ORAL at 19:19

## 2018-02-10 RX ADMIN — BUPRENORPHINE HYDROCHLORIDE, NALOXONE HYDROCHLORIDE 1 FILM: 4; 1 FILM, SOLUBLE BUCCAL; SUBLINGUAL at 19:19

## 2018-02-10 RX ADMIN — CLONIDINE HYDROCHLORIDE 0.1 MG: 0.1 TABLET ORAL at 17:56

## 2018-02-10 RX ADMIN — BUPRENORPHINE HYDROCHLORIDE, NALOXONE HYDROCHLORIDE 1 FILM: 4; 1 FILM, SOLUBLE BUCCAL; SUBLINGUAL at 07:59

## 2018-02-10 RX ADMIN — OLANZAPINE 10 MG: 10 TABLET, FILM COATED ORAL at 17:56

## 2018-02-10 RX ADMIN — LORAZEPAM 0.5 MG: 0.5 TABLET ORAL at 08:36

## 2018-02-10 RX ADMIN — QUETIAPINE FUMARATE 200 MG: 200 TABLET ORAL at 07:58

## 2018-02-10 ASSESSMENT — ACTIVITIES OF DAILY LIVING (ADL)
ORAL_HYGIENE: INDEPENDENT
GROOMING: INDEPENDENT
DRESS: INDEPENDENT
DRESS: INDEPENDENT
ORAL_HYGIENE: INDEPENDENT
GROOMING: INDEPENDENT
LAUNDRY: WITH SUPERVISION

## 2018-02-10 NOTE — PROGRESS NOTES
"RN 48    Patient OWS @ 0800 = 7    Patient offers, first thing in morning, I would rather go to long term than CD treatment. Further discussion yielded c/o \"poor sleep\" adding \"I am going to try and stick this out.\" Patient participated in CD eval scheduled for today.    Patient GOAL \"suboxone maintenance provider and provider that can prescribe me seroquel-the last one stopped it and I have a lot of anxiety. I am living with my grandparents now and that is a lot less stressful...\" Patient reports that methadone maintenance, done in past, was not helpful.\"    0800: PRN tylenol given for generalized aches.  0840 PRN ativan given for observed anxiety/agitation    OWS @ 1330 = 5    Mom visited-\"went fine.\" Patient out of room for meals (eating 100%), med-compliant and appropriate/quiet with staff/peers    Patient denies SI/SIB.     Nursing will continue to monitor.  "

## 2018-02-10 NOTE — PROGRESS NOTES
"[facility], [place]   Psychiatric Progress Note        Interim History:   Reason for Hospitalization:Kendell is a 27 year old male with history of opiate and meth use disorder who was admitted on a voluntary basis from [facility] ED for self injurious behaviors (superfical cuts to forearms B/L) with recent (and prevalent) heroin and meth use.    Subjective: \"I don't want to talk to you\"    As per today's interview: Patient was seen in his room, laying on a haphazard mess of his bed, with his scrub pants almost falling off. He was irritable and mentioned that he did not want to talk to me. He is ok with taking Suboxone. Denied any current SI/HI/AH/VH.          Medications:       [START ON 2/10/2018] buprenorphine HCl-naloxone HCl  1 Film Sublingual BID     [START ON 2/11/2018] buprenorphine HCl-naloxone HCl  1 Film Sublingual BID     QUEtiapine  100 mg Oral At Bedtime     QUEtiapine  200 mg Oral Daily     traZODone  50 mg Oral At Bedtime          Allergies:     Allergies   Allergen Reactions     Clindamycin Hcl      Latex      Latex tape     Neosporin [Neomycin-Polymyx-Gramicid]      Penicillins Hives     Pertussis Vaccine      Cried for 36 hours     Vancomycin           Labs:   No results found for this or any previous visit (from the past 48 hour(s)).       Psychiatric Examination:   /65  Pulse 98  Temp 98.4  F (36.9  C)  Resp 16  Ht 1.829 m (6')  Wt 69.9 kg (154 lb)  BMI 20.89 kg/m2  Weight is 154 lbs 0 oz  Body mass index is 20.89 kg/(m^2).    Appearance:  male, very disheveled, poor hygiene. Forearms/arms covered in tattoos.   Attitude:  Irritable, dismissive  Eye Contact:  Poor   Mood:  Irritable, \"Whatever\"   Affect:  Subdued and shows some exaggerated range    Speech: Rapid in rate, regular in rhythm.   Language: fluent and intact in English  Psychomotor, Gait, Musculoskeletal:  Some restlessness. Otherwise unremarkable   Throught Process: Linear  Associations:  " Intact   Thought Content: No current SI/HI/AH/VH.   Insight:  Poor   Judgement:  Poor   Oriented to:  Person, place, time   Attention Span and Concentration: Average   Recent and Remote Memory:  Intact   Fund of Knowledge: Below Average       Assessment & Plan       Principal Diagnosis:   Unspecified Mood Disorder vs. Major Depressive Disorder   Opiate Use Disorder  Amphetamine Use Disorder    Assessment:   (Initial Assessment 2/7):   Kendell appears to have prevalent IV heroin use (along with meth use), and has had worsening depressive symptoms with recent self injurious behavior. As per reports, he's had multiple psychosocial factors that could precipitate/perpetuate his use including death of two friends, and loosing his kids to his ex. He also has pending court dates later in the year. He has not been taking any antidepressant medications, but is open to starting on medications that claimed helped him such as Seroquel. Regarding his drug use, his last use of heroin was appoximately yesterday at 8:23 PM. His most recent vitals were unremarkable. He did seem somewhat restless during interview today, therefor he could possibly be starting to go through relapse symptoms. I will add Suboxone, and Opiate Withdrawal scale, and have Suboxone given when he starts to exhibit withdrawal scores of 8 or higher, or around 8:30 PM (which would be 24 hrs post use), whichever comes first.     (Update 2/8): Patient signed 12 hour notice to leave. He has a history of daily Heroin use, and has been kicked out of his CD program in [place] (due to not attending), thus does not have a plan for CD treatment. Also, his mother expressed that she was quite worried about his imminent safety, and would quickly fall back into his patter of IV drug use and self injury right away if he were no on medications and did not have a plan for CD treatment. The patient himself is quite irritable, slightly agitated, dismissive and visually looks  quite disheveled, unkept, with poor hygiene. He is likely experiencing some cravings along with upwelling underlying depressive/anxiety coming to the surface. Due to these concerns for safety, I will place a 72 hour hold.     (Update 2/9): Dismissive towards me today, but less angry. He was compliant with his medications. Since he does not have an outpatient Suboxone provider, I will not continue Suboxone on an outpatient basis. I will start to taper him from 8 mg of Suboxone which I had started. CD assessement will happen on Saturday, which I hope he will participate in. Likely discharge next week on meds (except Suboxone). As per reports, he has been to treatment in the past, multiple times, with ongoing failure. Not suicidal today.      Plan:  - Suboxone 4 mg BID (tommorrw), Suboxone 2 mg BID on Sunday.   - Seroquel 200 mg Daily and 100 mg HS   - Trazodone 50 mg   - CD assessment/Rule 25 on Saturday      Legal:  72 hour hold (expires 2/13 at 3:17 PM)      Disposition:  Unclear, likely d/c next week after hold expires.          Juan Stokes MD  [facility]

## 2018-02-11 PROCEDURE — 25000132 ZZH RX MED GY IP 250 OP 250 PS 637: Performed by: PSYCHIATRY & NEUROLOGY

## 2018-02-11 PROCEDURE — 12400007 ZZH R&B MH INTERMEDIATE UMMC

## 2018-02-11 RX ADMIN — TRAZODONE HYDROCHLORIDE 50 MG: 50 TABLET ORAL at 20:12

## 2018-02-11 RX ADMIN — BUPRENORPHINE HYDROCHLORIDE, NALOXONE HYDROCHLORIDE 1 FILM: 2; .5 FILM, SOLUBLE BUCCAL; SUBLINGUAL at 08:53

## 2018-02-11 RX ADMIN — OLANZAPINE 10 MG: 10 TABLET, FILM COATED ORAL at 02:48

## 2018-02-11 RX ADMIN — HYDROXYZINE HYDROCHLORIDE 50 MG: 25 TABLET ORAL at 02:47

## 2018-02-11 RX ADMIN — QUETIAPINE FUMARATE 200 MG: 200 TABLET ORAL at 08:53

## 2018-02-11 RX ADMIN — QUETIAPINE FUMARATE 100 MG: 100 TABLET ORAL at 20:12

## 2018-02-11 RX ADMIN — BUPRENORPHINE HYDROCHLORIDE, NALOXONE HYDROCHLORIDE 1 FILM: 2; .5 FILM, SOLUBLE BUCCAL; SUBLINGUAL at 20:13

## 2018-02-11 ASSESSMENT — ACTIVITIES OF DAILY LIVING (ADL)
DRESS: INDEPENDENT
GROOMING: INDEPENDENT
DRESS: INDEPENDENT
ORAL_HYGIENE: INDEPENDENT
GROOMING: INDEPENDENT
LAUNDRY: WITH SUPERVISION
LAUNDRY: WITH SUPERVISION
ORAL_HYGIENE: INDEPENDENT

## 2018-02-11 NOTE — PROGRESS NOTES
Mark Anthony said he was irritable this evening because his medications keep getting switched around and that is frustrating him. He has to keep trying to adjust to new medication. Even though he said he was frustrated, he was in a better mood than previous days according to other staff. He said he is still withdrawing. His symptoms according to him include cold sweats, headache and was irritable. He denied SI, SIB, depression 6/10, anxiety 9/10. He is super hopeful for the future.      02/10/18 2200   Behavioral Health   Hallucinations denies / not responding to hallucinations   Thinking intact   Orientation person: oriented;place: oriented;date: oriented   Memory baseline memory   Insight insight appropriate to situation   Judgement impaired   Eye Contact at examiner   Affect full range affect   Mood mood is calm   Physical Appearance/Attire attire appropriate to age and situation   Hygiene other (see comment)  (adequate )   Suicidality other (see comments)  (pt denied )   1. Wish to be Dead No   2. Non-Specific Active Suicidal Thoughts  No   Self Injury other (see comment)  (pt denied )   Activity other (see comment)  (social in [facility] for majority of night )   Speech clear;coherent   Medication Sensitivity no stated side effects   Psychomotor / Gait balanced;steady   Activities of Daily Living   Hygiene/Grooming independent   Oral Hygiene independent   Dress independent   Room Organization independent

## 2018-02-11 NOTE — PROGRESS NOTES
"   02/11/18 1400   Behavioral Health   Hallucinations denies / not responding to hallucinations   Thinking poor concentration   Orientation person: oriented;place: oriented   Memory baseline memory   Insight poor   Judgement impaired   Eye Contact at examiner   Affect full range affect   Mood mood is calm   Physical Appearance/Attire neat   Hygiene well groomed   Suicidality other (see comments)  (denies )   1. Wish to be Dead No   2. Non-Specific Active Suicidal Thoughts  No   3. Active Sucidal Ideation with any Methods (Not Plan) Without Intent to Act  No   4. Active Suicidal Ideation with Some Intent to Act, Without Specific Plan  No   5. Active Suicidal Ideation with Specific Plan and Intent  No   Self Injury other (see comment)  (denies )   Elopement (None reported or observed )   Activity isolative;withdrawn   Speech clear;coherent   Activities of Daily Living   Hygiene/Grooming independent   Oral Hygiene independent   Dress independent   Laundry with supervision   Room Organization independent       Pt denied SI and SIB.  Pt reported feeling depressed (4), confused (4) and anxious (3) \" like sh*t dope sick dope sickness.\"  Pt seems calm, ate, isolative, withdrawn and spend most of the shift in bed.  Pt daily goal \" don't have one.\"  Pt goal after discharge \" I just want to live a better life.\"  Pt reported having difficulty staying and falling asleep.  Pt reported no SE's.  Pt is independent with ADL's.  Pt reported no nutritional concerns.  Pt reported pain lower back (5-6) \" pins and needles.\"  Pt wants visitors.    "

## 2018-02-12 VITALS
WEIGHT: 154 LBS | DIASTOLIC BLOOD PRESSURE: 76 MMHG | BODY MASS INDEX: 20.86 KG/M2 | RESPIRATION RATE: 16 BRPM | HEART RATE: 96 BPM | HEIGHT: 72 IN | SYSTOLIC BLOOD PRESSURE: 114 MMHG | TEMPERATURE: 98 F

## 2018-02-12 PROBLEM — F33.1 MODERATE EPISODE OF RECURRENT MAJOR DEPRESSIVE DISORDER (H): Status: ACTIVE | Noted: 2018-02-12

## 2018-02-12 PROBLEM — F11.20: Status: ACTIVE | Noted: 2018-02-12

## 2018-02-12 PROBLEM — Z72.0 TOBACCO USE: Status: ACTIVE | Noted: 2018-02-12

## 2018-02-12 PROBLEM — F41.1 GAD (GENERALIZED ANXIETY DISORDER): Status: ACTIVE | Noted: 2018-02-12

## 2018-02-12 PROCEDURE — 99238 HOSP IP/OBS DSCHRG MGMT 30/<: CPT | Performed by: PSYCHIATRY & NEUROLOGY

## 2018-02-12 PROCEDURE — 25000132 ZZH RX MED GY IP 250 OP 250 PS 637: Performed by: PSYCHIATRY & NEUROLOGY

## 2018-02-12 PROCEDURE — H2032 ACTIVITY THERAPY, PER 15 MIN: HCPCS

## 2018-02-12 RX ORDER — TRAZODONE HYDROCHLORIDE 50 MG/1
50 TABLET, FILM COATED ORAL AT BEDTIME
Qty: 30 TABLET | Refills: 1 | Status: SHIPPED | OUTPATIENT
Start: 2018-02-12

## 2018-02-12 RX ORDER — CLONIDINE HYDROCHLORIDE 0.1 MG/1
0.1 TABLET ORAL 2 TIMES DAILY PRN
Qty: 60 TABLET | Refills: 1 | Status: SHIPPED | OUTPATIENT
Start: 2018-02-12

## 2018-02-12 RX ORDER — QUETIAPINE FUMARATE 100 MG/1
100 TABLET, FILM COATED ORAL AT BEDTIME
Qty: 30 TABLET | Refills: 1 | Status: SHIPPED | OUTPATIENT
Start: 2018-02-12

## 2018-02-12 RX ORDER — OLANZAPINE 10 MG/1
10 TABLET ORAL
Qty: 30 TABLET | Refills: 1 | Status: SHIPPED | OUTPATIENT
Start: 2018-02-12

## 2018-02-12 RX ORDER — QUETIAPINE FUMARATE 200 MG/1
200 TABLET, FILM COATED ORAL DAILY
Qty: 30 TABLET | Refills: 1 | Status: SHIPPED | OUTPATIENT
Start: 2018-02-13

## 2018-02-12 RX ORDER — HYDROXYZINE HYDROCHLORIDE 25 MG/1
50 TABLET, FILM COATED ORAL EVERY 4 HOURS PRN
Qty: 60 TABLET | Refills: 1 | Status: SHIPPED | OUTPATIENT
Start: 2018-02-12 | End: 2018-09-14

## 2018-02-12 RX ADMIN — HYDROXYZINE HYDROCHLORIDE 50 MG: 25 TABLET ORAL at 02:58

## 2018-02-12 RX ADMIN — OLANZAPINE 10 MG: 10 TABLET, FILM COATED ORAL at 02:58

## 2018-02-12 RX ADMIN — BUPRENORPHINE HYDROCHLORIDE, NALOXONE HYDROCHLORIDE 1 FILM: 2; .5 FILM, SOLUBLE BUCCAL; SUBLINGUAL at 08:09

## 2018-02-12 RX ADMIN — QUETIAPINE FUMARATE 200 MG: 200 TABLET ORAL at 08:09

## 2018-02-12 ASSESSMENT — ACTIVITIES OF DAILY LIVING (ADL)
ORAL_HYGIENE: PROMPTS
LAUNDRY: UNABLE TO COMPLETE
GROOMING: INDEPENDENT
DRESS: INDEPENDENT
LAUNDRY: WITH SUPERVISION
ORAL_HYGIENE: INDEPENDENT
DRESS: SCRUBS (BEHAVIORAL HEALTH)
GROOMING: INDEPENDENT;PROMPTS

## 2018-02-12 NOTE — PROGRESS NOTES
Pt is insisting on discharge.  Met with pt to discuss relapse prevention plan.  Strongly encouraged pt to follow up with Mary Torres.  He was given her phone number to arrange chemical health treatment.  Mary was recommending residential but pt expressed wish for outpatient tx and suboxone maintenance.  Mary suggested [facility] for suboxone as a possibility.      Placed call to Mary to inform of discharge.

## 2018-02-12 NOTE — PLAN OF CARE
Problem: Mood Impairment (Depressive Signs/Symptoms) (Adult)  Goal: Improved Mood Symptoms (Depressive Signs/Symptoms)  Pt denies suicidal ideation or homicidal ideation. Has received all belongings. Discharge medications and follow up instructions reviewed with patient. Patient verbalizes understanding of discharge instructions.

## 2018-02-12 NOTE — DISCHARGE INSTRUCTIONS
Behavioral Discharge Planning and Instructions    Summary:  You were admitted on 2/6/2018  due to Depression, Suicidal Ideations, Self Injurious Behaviors and Chemical Use Issues.  You were treated by Dr Juan Stokes MD and discharged on 2/12/2018 from [facility]      Principal Diagnosis:     Unspecified Mood Disorder vs. Major Depressive Disorder   Opiate Use Disorder  Amphetamine Use Disorder      Health Care Follow-up Appointments:     You had a chemical health evaluation with Mary Torres ([facility] ) on Saturday 2/10/18.  You can reach her at [phone].  She recommended a residential program but you are requesting an outpatient program and to follow up with suboxone.  Because you insisted on being discharged today, we were unable to provide final referral appointments, etc.  Mary reported that you can consider getting [facility] [place].      Should you decide to attend psychiatry or therapy you can schedule with:  [facility] [address] [place] [address]   Phone: [phone]      Mental Health Crisis Program provides crisis intervention to residents of [facility] [place]. This service is available 24 hours a day, 7 days a week. The Crisis Program provides telephone crisis intervention and mobile on-site response to assess and stabilize immediate crisis.  For more information, please call [phone]    [facility]  Mental health service in [place], [place]  Address: [address] [place] [address]  Phone: [phone]      If no appointments scheduled, explain: pt refused.  He states he will follow up by getting his own appointments.    Attend all scheduled appointments with your outpatient providers. Call at least 24 hours in advance if you need to reschedule an appointment to ensure continued access to your outpatient providers.   Major Treatments,  "Procedures and Findings:  You were provided with: a psychiatric assessment, assessed for medical stability, medication evaluation and/or management, milieu management and detox.    Symptoms to Report: losing more sleep, mood getting worse, thoughts of suicide or relapse.    Early warning signs can include: increased depression or anxiety sleep disturbances increased thoughts or behaviors of suicide or self-harm and substance use.      Safety and Wellness:  Take all medicines as directed.  Make no changes unless your doctor suggests them. Follow treatment recommendations.  Refrain from alcohol and non-prescribed drugs.  If there is a concern for safety, call 911.    Resources:   Crisis Intervention: 403.687.5957 or 961-437-1265 (TTY: 591.418.6050).  Call anytime for help.  National [place] on Mental Illness (www.mn.nandini.org): 756.171.2475 or 627-498-3721.  Alcoholics Anonymous (www.alcoholics-anonymous.org): Check your phone book for your local chapter.  Suicide Awareness Voices of Education (SAVE) (www.save.org): 601-469-DJQE (2664)  National Suicide Prevention Line (www.mentalhealthmn.org): 859-926-YUEK (1267)  [facility] Crisis Response 687-109-1361  Crisis text line: Text \"START\" to 124-536. Free, confidential, 24/7.    The treatment team has appreciated the opportunity to work with you.     If you have any questions or concerns our unit number is [phone].  You may be receiving a follow-up phone call within the next three days from a representative from behavioral health.    You have identified the best phone number to reach you as [phone]        "

## 2018-02-12 NOTE — PROGRESS NOTES
Pt has been in his room most of the P.M. Pt stating he's not suicidal or sib. Pt stating he's feeling miserable physically from withdrawing from heroin. Pt stating he doesn't want to go back to heroin being he knows he'll kill himself if he does. Pt states he plans to work on himself so he can have his children back and start having a better life for himself. Pt very patient and [facility]. Pt has been on the periphery of group activity.

## 2018-02-12 NOTE — DISCHARGE SUMMARY
[facility] [place]   Psychiatric Discharge Summary      Kendell Prasad MRN# [id number]   Age: 27 year old YOB: 1990     Date of Admission:  2/6/2018  Date of Discharge:  02/12/18  Admitting Physician:  Juan Stokes MD  Discharge Physician:  Juan Stokes MD         Summary/Hospital Course/Disposition:     Reason for Hospitalization:Kendell is a 27 year old male with history of opiate and meth use disorder who was admitted on a voluntary basis from [facility] ED for self injurious behaviors (superfical cuts to forearms B/L) with recent (and prevalent) heroin and meth use.    Hospital Course:  (Initial Assessment 2/7):   Kendell appears to have prevalent IV heroin use (along with meth use), and has had worsening depressive symptoms with recent self injurious behavior. As per reports, he's had multiple psychosocial factors that could precipitate/perpetuate his use including death of two friends, and loosing his kids to his ex. He also has pending court dates later in the year. He has not been taking any antidepressant medications, but is open to starting on medications that claimed helped him such as Seroquel. Regarding his drug use, his last use of heroin was appoximately yesterday at 8:23 PM. His most recent vitals were unremarkable. He did seem somewhat restless during interview today, therefor he could possibly be starting to go through relapse symptoms. I will add Suboxone, and Opiate Withdrawal scale, and have Suboxone given when he starts to exhibit withdrawal scores of 8 or higher, or around 8:30 PM (which would be 24 hrs post use), whichever comes first.      (Update 2/8): Patient signed 12 hour notice to leave. He has a history of daily Heroin use, and has been kicked out of his CD program in [place] (due to not attending), thus does not have a plan for CD treatment. Also, his mother expressed that she was quite worried about his imminent safety, and  would quickly fall back into his patter of IV drug use and self injury right away if he were no on medications and did not have a plan for CD treatment. The patient himself is quite irritable, slightly agitated, dismissive and visually looks quite disheveled, unkept, with poor hygiene. He is likely experiencing some cravings along with upwelling underlying depressive/anxiety coming to the surface. Due to these concerns for safety, I will place a 72 hour hold.      (Update 2/9): Dismissive towards me today, but less angry. He was compliant with his medications. Since he does not have an outpatient Suboxone provider, I will not continue Suboxone on an outpatient basis. I will start to taper him from 8 mg of Suboxone which I had started. CD assessement will happen on Saturday, which I hope he will participate in. Likely discharge next week on meds (except Suboxone). As per reports, he has been to treatment in the past, multiple times, with ongoing failure. Not suicidal today.      Discharge Day Assessment (2/12): Patient continued to be frustrated with being in the hospital. He had done the CD assessment on Saturday. He had been compliant with Suboxone, and his other medications overall. I discussed that I was in the process of tapering his Suboxone, but since he participated in the CD assessment over the weekend, I would be willing to have him on a better (maintaince) dose, and would work on establishing an outpatient Suboxone provider. He wholly refused with the notion of having to stay in the hospital any longer. He mentioned he would pursue CD treatment and psychiatry on his own (as per reports, has tired before, and failed), nevertheless, he is currently not exhibiting any SI (intent or plan)/HI/AH/VH, nor any SIB. He does exhibit poor decision making, and continued irritability, and likely craving behaviors, although these are chronic issues, and has gone through CD tx multiple times, thus will be discharged due  to not meeting status for continued hospitalization.                 DIagnoses:   Unspecified Mood Disorder vs. Major Depressive Disorder   Opiate Use Disorder  Amphetamine Use Disorder         Labs:   No results found for this or any previous visit (from the past 24 hour(s)).         Consults:   None            Discharge Medications:        Review of your medicines      START taking       Dose / Directions    cloNIDine 0.1 MG tablet   Commonly known as:  CATAPRES   Used for:  AWILDA (generalized anxiety disorder), Heroin use disorder, severe (H), Moderate episode of recurrent major depressive disorder (H)        Dose:  0.1 mg   Take 1 tablet (0.1 mg) by mouth 2 times daily as needed (for anxiety, agitation)   Quantity:  60 tablet   Refills:  1       hydrOXYzine 25 MG tablet   Commonly known as:  ATARAX   Used for:  AWILDA (generalized anxiety disorder), Moderate episode of recurrent major depressive disorder (H)        Dose:  50 mg   Take 2 tablets (50 mg) by mouth every 4 hours as needed for anxiety   Quantity:  60 tablet   Refills:  1       OLANZapine 10 MG tablet   Commonly known as:  zyPREXA   Used for:  Heroin use disorder, severe (H), Moderate episode of recurrent major depressive disorder (H), AWILDA (generalized anxiety disorder)        Dose:  10 mg   Take 1 tablet (10 mg) by mouth every 2 hours as needed (anxiety, depressed moods.)   Quantity:  30 tablet   Refills:  1       * QUEtiapine 100 MG tablet   Commonly known as:  SEROquel   Used for:  AWILDA (generalized anxiety disorder), Moderate episode of recurrent major depressive disorder (H), Heroin use disorder, severe (H)   Replaces:  SEROQUEL XR PO        Dose:  100 mg   Take 1 tablet (100 mg) by mouth At Bedtime   Quantity:  30 tablet   Refills:  1       * QUEtiapine 200 MG tablet   Commonly known as:  SEROquel   Used for:  AWILDA (generalized anxiety disorder), Moderate episode of recurrent major depressive disorder (H), Heroin use disorder, severe (H)        Dose:   "200 mg   Start taking on:  2/13/2018   Take 1 tablet (200 mg) by mouth daily   Quantity:  30 tablet   Refills:  1       traZODone 50 MG tablet   Commonly known as:  DESYREL   Used for:  AWILDA (generalized anxiety disorder), Moderate episode of recurrent major depressive disorder (H)        Dose:  50 mg   Take 1 tablet (50 mg) by mouth At Bedtime   Quantity:  30 tablet   Refills:  1       * Notice:  This list has 2 medication(s) that are the same as other medications prescribed for you. Read the directions carefully, and ask your doctor or other care provider to review them with you.      CONTINUE these medicines which have NOT CHANGED       Dose / Directions    ibuprofen 200 MG tablet   Commonly known as:  ADVIL/MOTRIN        Dose:  600 mg   Take 3 tablets (600 mg) by mouth every 8 hours as needed for mild pain   Quantity:  30 tablet   Refills:  0         STOP taking          BACLOFEN PO           methocarbamol 500 MG tablet   Commonly known as:  ROBAXIN           PAXIL PO           SEROQUEL XR PO   Replaced by:  QUEtiapine 100 MG tablet           ZITHROMAX PO                Where to get your medicines      These medications were sent to [id number] - [place] [id number] [address]     Phone:  [phone]      cloNIDine 0.1 MG tablet     hydrOXYzine 25 MG tablet     OLANZapine 10 MG tablet     QUEtiapine 100 MG tablet     QUEtiapine 200 MG tablet     traZODone 50 MG tablet                  Mental Status Examination:   Appearance:  male, very disheveled, poor hygiene. Forearms/arms covered in tattoos.   Attitude:  Irritable, dismissive  Eye Contact:  Poor   Mood:  Irritable, \"I'm ok\"   Affect:   shows some exaggerated range    Speech: Rapid in rate, regular in rhythm.   Language: fluent and intact in English  Psychomotor, Gait, Musculoskeletal:  Some restlessness. Otherwise unremarkable   Throught Process: Linear  Associations:  Intact   Thought Content: No " current SI/HI/AH/VH.   Insight:  Poor   Judgement:  Poor   Oriented to:  Person, place, time   Attention Span and Concentration: Average   Recent and Remote Memory:  Intact   Fund of Knowledge: Below Average          Discharge Plan:   No discharge procedures on file.    - Patient discharged back home to mother's care.   - Plans to follow up with outpatient CD help and psychiatry on his own.     Juan Stokes MD  [facility]

## 2018-02-12 NOTE — PROGRESS NOTES
Pt appeared somewhat confused during 1 to 1 with staff. His responses were not congruent with the questions asked, at times. When asked how his appetite is, he said I'd rather stay at home.The question was repeated & the answer still did not correlate with question.   Pt denies drug use & stated his big problem in life is timeliness.. Pt denies si/sib. He stated he was bored & has a job interview to go to. When staff asked details about this, he was not able to answer. Pt polite, isolative, affect flat. Pt does not want treatment & has poor insight but denies any thoughts of suicide. He will likely be discharged today.     02/12/18 1100   Behavioral Health   Hallucinations denies / not responding to hallucinations   Thinking confused;poor concentration   Orientation person: oriented   Memory short term   Insight denial of illness;poor   Judgement impaired   Affect blunted, flat;sad;irritable   Mood depressed;anxious   Hygiene neglected grooming - unclean body, hair, teeth   Suicidality other (see comments)  (denies)   1. Wish to be Dead No   2. Non-Specific Active Suicidal Thoughts  No   Speech clear;coherent   Coping/Psychosocial   Verbalized Emotional State anxiety;depression;frustration   Plan Of Care Reviewed With patient   Psycho Education   Type of Intervention 1:1 intervention   Response participates with encouragement   Hours 0.5   Treatment Detail check in   Activities of Daily Living   Hygiene/Grooming independent;prompts   Oral Hygiene prompts   Dress scrubs (behavioral health)   Laundry unable to complete   Room Organization independent   Activity   Activity Assistance Provided independent   Behavioral Health Interventions   Depression maintain safety precautions;maintain safe secure environment   Social and Therapeutic Interventions (Depression) encourage socialization with peers

## 2018-06-22 ENCOUNTER — OFFICE VISIT (OUTPATIENT)
Dept: OPHTHALMOLOGY | Facility: CLINIC | Age: 28
End: 2018-06-22
Payer: COMMERCIAL

## 2018-06-22 ENCOUNTER — TELEPHONE (OUTPATIENT)
Dept: OPHTHALMOLOGY | Facility: CLINIC | Age: 28
End: 2018-06-22

## 2018-06-22 DIAGNOSIS — H53.2 DIPLOPIA: Primary | ICD-10-CM

## 2018-06-22 DIAGNOSIS — H52.03 HYPERMETROPIA OF BOTH EYES: ICD-10-CM

## 2018-06-22 ASSESSMENT — EXTERNAL EXAM - LEFT EYE: OS_EXAM: NORMAL

## 2018-06-22 ASSESSMENT — REFRACTION_MANIFEST
OD_AXIS: 025
OS_SPHERE: +5.75
OD_CYLINDER: +1.25
OS_CYLINDER: +1.00
OS_AXIS: 101
OD_SPHERE: +4.50

## 2018-06-22 ASSESSMENT — VISUAL ACUITY
OD_CC: J1+
METHOD: SNELLEN - LINEAR
CORRECTION_TYPE: GLASSES
OD_CC: 20/20
OS_CC+: +
OS_CC: 20/50

## 2018-06-22 ASSESSMENT — REFRACTION_WEARINGRX
OS_CYLINDER: +0.50
OD_SPHERE: +4.00
SPECS_TYPE: SVL
OD_AXIS: 025
OD_CYLINDER: +1.00
OS_AXIS: 104
OS_SPHERE: +6.00

## 2018-06-22 ASSESSMENT — CONF VISUAL FIELD
METHOD: COUNTING FINGERS
OD_NORMAL: 1
OS_NORMAL: 1

## 2018-06-22 ASSESSMENT — CUP TO DISC RATIO
OS_RATIO: 0.55
OD_RATIO: 0.55

## 2018-06-22 ASSESSMENT — SLIT LAMP EXAM - LIDS
COMMENTS: NORMAL
COMMENTS: NORMAL

## 2018-06-22 ASSESSMENT — TONOMETRY
IOP_METHOD: ICARE
OD_IOP_MMHG: 11
OS_IOP_MMHG: 15

## 2018-06-22 ASSESSMENT — EXTERNAL EXAM - RIGHT EYE: OD_EXAM: NORMAL

## 2018-06-22 NOTE — NURSING NOTE
Chief Complaints and History of Present Illnesses   Patient presents with     Annual Eye Exam     HPI    Affected eye(s):  Both   Symptoms:     Blurred vision      Frequency:  Constant       Do you have eye pain now?:  No      Comments:  Patient states he cannot see, this is ongoing for many years. Last eye exam a year ago was told glasses had not changed. He has ongoing double vision, worse when tired. Images are one on top of the other. If cover left eye double vision goes away. No eye pain or irritation. Uses ATs PRN OU     Had surgery on both eyes in the past for lazy left eye.    Vianney Cortez COT 9:44 AM June 22, 2018

## 2018-06-22 NOTE — PROGRESS NOTES
Assessment/Plan  (H53.2) Diplopia  (primary encounter diagnosis)  Comment: Patient dilated before diplopia assessment. Past history of multiple surgeries per patient. Patient expressed strong desire to feel normal again, as he is very conscious of others staring at his left eye.   Plan: Discussed findings with patient. Patient was not receptive today to a prism evaluation. Will refer patient to Dr. Castro for a baseline evaluation. Unclear if patient would be a good surgical candidate given his past history. An assessment by an orthoptist may be warranted, but given his amblyopia, unlikely there would be any appreciable improvement in vision OS.     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 SRx and CL Rx updated and released. 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 Advised patient to call/RTC with any problems with his lenses. Patient was fit in trial lenses after dilation today but still achieved 20/25+ vision OD.     Contact Lens Billing  V-Code:  - Soft spherical  Final Contact Lens Rx      Brand Base Curve Diameter Sphere Cylinder   Right Ever Biofinity BC 8.6, D 14.0 8.60 14.0 +5.50 Sphere   Left Ever Biofinity BC 8.6, D 14.0 8.60 14.0 +6.50 Sphere       Expiration Date:  6/22/2020    Replacement:  Monthly         Price per Unit: $75 fitting fee     These are for cosmetic contact lenses.    Encounter Diagnoses   Name Primary?     Diplopia Yes     Hypermetropia of both eyes         Date of last eye exam: Today, 6/22/18        Complete documentation of historical and exam elements from today's encounter can  be found in the full encounter summary report (not reduplicated in this progress  note). I personally obtained the chief complaint(s) and history of present illness. I  confirmed and edited as necessary  the review of systems, past medical/surgical  history, family history, social history, and examination findings as documented by  others; and I examined the patient myself. I personally reviewed the relevant tests,  images, and reports as documented above. I formulated and edited as necessary the  assessment and plan and discussed the findings and management plan with the  patient and family.    Cihp Callaway OD

## 2018-06-22 NOTE — MR AVS SNAPSHOT
After Visit Summary   6/22/2018    Kendell Prasad    MRN: [id number]           Patient Information     Date Of Birth          1990        Visit Information        Provider Department      6/22/2018 9:40 AM Chip Callaway, [facility]        Today's Diagnoses     Diplopia    -  1    Hypermetropia of both eyes           Follow-ups after your visit        Who to contact     Please call your clinic at [phone] to:    Ask questions about your health    Make or cancel appointments    Discuss your medicines    Learn about your test results    Speak to your doctor            Additional Information About Your Visit        [employer] Information     [employer] gives you secure access to your electronic health record. If you see a primary care provider, you can also send messages to your care team and make appointments. If you have questions, please call your primary care clinic.  If you do not have a primary care provider, please call [phone] and they will assist you.      [employer] is an electronic gateway that provides easy, online access to your medical records. With [employer], you can request a clinic appointment, read your test results, renew a prescription or communicate with your care team.     To access your existing account, please contact your [facility] or call [phone] for assistance.        Care EveryWhere ID     This is your Care EveryWhere ID. This could be used by other organizations to access your [place] medical records  [id number]         Blood Pressure from Last 3 Encounters:   02/12/18 114/76   02/05/18 120/82   12/16/16 117/63    Weight from Last 3 Encounters:   02/06/18 69.9 kg (154 lb)   02/05/18 70 kg (154 lb 5.2 oz)   12/16/16 73.8 kg (162 lb 11.2 oz)              We Performed the Following     HC CONTACT LENS FITTING COSMETIC LVL 1 (15613.011)     REFRACTION [[id number]]        Primary Care Provider Fax #    Physician No  Ref-Primary [phone]       No address on file        Equal Access to Services     SENAIT JUNE : Hadii pascale aldrich ruddy Lau, wabernardada luambreenanita, qacasey kaignacio whitnahid, waxmayco chocoin hayaathuan shermanracquel gabriel lajackiethuan jensen. So [facility] [phone].    ATENCIÓN: Si habla español, tiene a gillis disposición servicios gratuitos de asistencia lingüística. Llame al [phone].    We comply with applicable federal civil rights laws and [place] laws. We do not discriminate on the basis of race, color, national origin, age, disability, sex, sexual orientation, or gender identity.            Thank you!     Thank you for choosing [facility]  for your care. Our goal is always to provide you with excellent care. Hearing back from our patients is one way we can continue to improve our services. Please take a few minutes to complete the written survey that you may receive in the mail after your visit with us. Thank you!             Your Updated Medication List - Protect others around you: Learn how to safely use, store and throw away your medicines at www.disposemymeds.org.          This list is accurate as of 6/22/18 11:02 AM.  Always use your most recent med list.                   Brand Name Dispense Instructions for use Diagnosis    cloNIDine 0.1 MG tablet    CATAPRES    60 tablet    Take 1 tablet (0.1 mg) by mouth 2 times daily as needed (for anxiety, agitation)    AWILDA (generalized anxiety disorder), Heroin use disorder, severe (H), Moderate episode of recurrent major depressive disorder (H)       hydrOXYzine 25 MG tablet    ATARAX    60 tablet    Take 2 tablets (50 mg) by mouth every 4 hours as needed for anxiety    AWILDA (generalized anxiety disorder), Moderate episode of recurrent major depressive disorder (H)       ibuprofen 200 MG tablet    ADVIL/MOTRIN    30 tablet    Take 3 tablets (600 mg) by mouth every 8 hours as needed for mild pain        OLANZapine 10 MG tablet    zyPREXA    30 tablet    Take 1 tablet (10 mg)  by mouth every 2 hours as needed (anxiety, depressed moods.)    Heroin use disorder, severe (H), Moderate episode of recurrent major depressive disorder (H), AWILDA (generalized anxiety disorder)       * QUEtiapine 100 MG tablet    SEROquel    30 tablet    Take 1 tablet (100 mg) by mouth At Bedtime    AWILDA (generalized anxiety disorder), Moderate episode of recurrent major depressive disorder (H), Heroin use disorder, severe (H)       * QUEtiapine 200 MG tablet    SEROquel    30 tablet    Take 1 tablet (200 mg) by mouth daily    AWILDA (generalized anxiety disorder), Moderate episode of recurrent major depressive disorder (H), Heroin use disorder, severe (H)       traZODone 50 MG tablet    DESYREL    30 tablet    Take 1 tablet (50 mg) by mouth At Bedtime    AWILDA (generalized anxiety disorder), Moderate episode of recurrent major depressive disorder (H)       * Notice:  This list has 2 medication(s) that are the same as other medications prescribed for you. Read the directions carefully, and ask your doctor or other care provider to review them with you.

## 2018-07-23 DIAGNOSIS — H53.2 DOUBLE VISION: Primary | ICD-10-CM

## 2018-09-14 ENCOUNTER — HOSPITAL ENCOUNTER (EMERGENCY)
Facility: CLINIC | Age: 28
Discharge: HOME | End: 2018-09-14
Attending: NURSE PRACTITIONER | Admitting: NURSE PRACTITIONER
Payer: COMMERCIAL

## 2018-09-14 VITALS
OXYGEN SATURATION: 99 % | TEMPERATURE: 99.1 F | HEART RATE: 80 BPM | DIASTOLIC BLOOD PRESSURE: 88 MMHG | RESPIRATION RATE: 18 BRPM | SYSTOLIC BLOOD PRESSURE: 133 MMHG

## 2018-09-14 DIAGNOSIS — R33.8 ACUTE URINARY RETENTION: ICD-10-CM

## 2018-09-14 DIAGNOSIS — M54.9 PAIN OF BACK AND LEFT LOWER EXTREMITY: ICD-10-CM

## 2018-09-14 DIAGNOSIS — M79.605 PAIN OF BACK AND LEFT LOWER EXTREMITY: ICD-10-CM

## 2018-09-14 LAB
ALBUMIN UR-MCNC: NEGATIVE MG/DL
ANION GAP SERPL CALCULATED.3IONS-SCNC: 5 MMOL/L (ref 3–14)
APPEARANCE UR: CLEAR
BASOPHILS # BLD AUTO: 0 10E9/L (ref 0–0.2)
BASOPHILS NFR BLD AUTO: 0.2 %
BILIRUB UR QL STRIP: NEGATIVE
BUN SERPL-MCNC: 15 MG/DL (ref 7–30)
CALCIUM SERPL-MCNC: 8.9 MG/DL (ref 8.5–10.1)
CHLORIDE SERPL-SCNC: 104 MMOL/L (ref 94–109)
CO2 SERPL-SCNC: 31 MMOL/L (ref 20–32)
COLOR UR AUTO: YELLOW
CREAT SERPL-MCNC: 0.89 MG/DL (ref 0.66–1.25)
DIFFERENTIAL METHOD BLD: ABNORMAL
EOSINOPHIL # BLD AUTO: 0 10E9/L (ref 0–0.7)
EOSINOPHIL NFR BLD AUTO: 0.2 %
ERYTHROCYTE [DISTWIDTH] IN BLOOD BY AUTOMATED COUNT: 12.9 % (ref 10–15)
GFR SERPL CREATININE-BSD FRML MDRD: >90 ML/MIN/1.7M2
GLUCOSE SERPL-MCNC: 98 MG/DL (ref 70–99)
GLUCOSE UR STRIP-MCNC: NEGATIVE MG/DL
HCT VFR BLD AUTO: 39.9 % (ref 40–53)
HGB BLD-MCNC: 13.6 G/DL (ref 13.3–17.7)
HGB UR QL STRIP: NEGATIVE
IMM GRANULOCYTES # BLD: 0.1 10E9/L (ref 0–0.4)
IMM GRANULOCYTES NFR BLD: 0.4 %
KETONES UR STRIP-MCNC: NEGATIVE MG/DL
LEUKOCYTE ESTERASE UR QL STRIP: NEGATIVE
LYMPHOCYTES # BLD AUTO: 1.4 10E9/L (ref 0.8–5.3)
LYMPHOCYTES NFR BLD AUTO: 11.3 %
MCH RBC QN AUTO: 29.5 PG (ref 26.5–33)
MCHC RBC AUTO-ENTMCNC: 34.1 G/DL (ref 31.5–36.5)
MCV RBC AUTO: 87 FL (ref 78–100)
MONOCYTES # BLD AUTO: 0.7 10E9/L (ref 0–1.3)
MONOCYTES NFR BLD AUTO: 5.7 %
NEUTROPHILS # BLD AUTO: 10.2 10E9/L (ref 1.6–8.3)
NEUTROPHILS NFR BLD AUTO: 82.2 %
NITRATE UR QL: NEGATIVE
NRBC # BLD AUTO: 0 10*3/UL
NRBC BLD AUTO-RTO: 0 /100
PH UR STRIP: 7 PH (ref 5–7)
PLATELET # BLD AUTO: 252 10E9/L (ref 150–450)
POTASSIUM SERPL-SCNC: 4.1 MMOL/L (ref 3.4–5.3)
RBC # BLD AUTO: 4.61 10E12/L (ref 4.4–5.9)
RBC #/AREA URNS AUTO: 1 /HPF (ref 0–2)
SODIUM SERPL-SCNC: 140 MMOL/L (ref 133–144)
SOURCE: NORMAL
SP GR UR STRIP: 1.01 (ref 1–1.03)
UROBILINOGEN UR STRIP-MCNC: 0 MG/DL (ref 0–2)
WBC # BLD AUTO: 12.4 10E9/L (ref 4–11)
WBC #/AREA URNS AUTO: 1 /HPF (ref 0–5)

## 2018-09-14 PROCEDURE — 85025 COMPLETE CBC W/AUTO DIFF WBC: CPT | Performed by: NURSE PRACTITIONER

## 2018-09-14 PROCEDURE — 96375 TX/PRO/DX INJ NEW DRUG ADDON: CPT

## 2018-09-14 PROCEDURE — 99284 EMERGENCY DEPT VISIT MOD MDM: CPT | Mod: 25

## 2018-09-14 PROCEDURE — 96374 THER/PROPH/DIAG INJ IV PUSH: CPT

## 2018-09-14 PROCEDURE — 80048 BASIC METABOLIC PNL TOTAL CA: CPT | Performed by: NURSE PRACTITIONER

## 2018-09-14 PROCEDURE — 81001 URINALYSIS AUTO W/SCOPE: CPT | Performed by: NURSE PRACTITIONER

## 2018-09-14 PROCEDURE — 25000128 H RX IP 250 OP 636: Performed by: NURSE PRACTITIONER

## 2018-09-14 RX ORDER — ONDANSETRON 2 MG/ML
4 INJECTION INTRAMUSCULAR; INTRAVENOUS EVERY 30 MIN PRN
Status: DISCONTINUED | OUTPATIENT
Start: 2018-09-14 | End: 2018-09-14 | Stop reason: HOSPADM

## 2018-09-14 RX ORDER — KETOROLAC TROMETHAMINE 15 MG/ML
15 INJECTION, SOLUTION INTRAMUSCULAR; INTRAVENOUS ONCE
Status: COMPLETED | OUTPATIENT
Start: 2018-09-14 | End: 2018-09-14

## 2018-09-14 RX ORDER — SODIUM CHLORIDE 9 MG/ML
INJECTION, SOLUTION INTRAVENOUS CONTINUOUS
Status: DISCONTINUED | OUTPATIENT
Start: 2018-09-14 | End: 2018-09-14 | Stop reason: HOSPADM

## 2018-09-14 RX ADMIN — KETOROLAC TROMETHAMINE 15 MG: 15 INJECTION, SOLUTION INTRAMUSCULAR; INTRAVENOUS at 12:24

## 2018-09-14 RX ADMIN — ONDANSETRON 4 MG: 2 INJECTION, SOLUTION INTRAMUSCULAR; INTRAVENOUS at 12:24

## 2018-09-14 RX ADMIN — SODIUM CHLORIDE: 9 INJECTION, SOLUTION INTRAVENOUS at 12:24

## 2018-09-14 ASSESSMENT — ENCOUNTER SYMPTOMS
NAUSEA: 1
BACK PAIN: 1
VOMITING: 0
HEMATURIA: 0
DIARRHEA: 0
ABDOMINAL PAIN: 1
DYSURIA: 0
DIFFICULTY URINATING: 1
CONSTIPATION: 0
FLANK PAIN: 1
FEVER: 0

## 2018-09-14 NOTE — ED PROVIDER NOTES
History     Chief Complaint:  Back Pain and Dysuria    HPI   Kendell Prasad is a 27 year old male, with a history of arthritis, ulcers, anxiety and depression who presents to the ED today for evaluation of left back pain and difficulty urinating. The patient denies a history of kidney stones or infection, but reports left flank pain and difficulty urinating since yesterday. He notes worsening pain when sitting, and relief when laying down. The patient states that he had similar issues 2 years ago, when he was seen in the ED and a catheter was placed, and due to the similarity of these symptoms presents to the ED for further evaluation and work up today. In addition to these symptoms the patient endorses generalized abdominal pain and nausea, but denies vomiting, constipation or diarrhea. The patient's last time of urination was about 1 hour prior to arrival, and while he denies dysuria or hematuria, he notes that his urine is dark, and that he has to sit down in order to urinate. The patient is otherwise healthy and denies any further acute symptoms while in the ED today.     Allergies:  Clindamycin Hcl  Keflex [Cephalexin]  Latex  Neosporin [Neomycin-Polymyx-Gramicid]  Penicillins  Pertussis Vaccine  Vancomycin    Medications:    Suboxone  Catapres  Seroquel   Desyrel  Zyprexa    Past Medical History:    Arthritis  Depression, Suicide attempt  AWILDA  Ulcers    Past Surgical History:    Appendectomy  Strabismus surgery  Tonsillectomy    Family History:    Depression  Anxiety  Bipolar disorder  Substance abuse  Autism  Retinal detachment     Social History:  Smoking Status: Current  Smokeless Tobacco: Never  Alcohol Use: Yes  Marital Status:  Single      Review of Systems   Constitutional: Negative for fever.   Gastrointestinal: Positive for abdominal pain and nausea. Negative for constipation, diarrhea and vomiting.   Genitourinary: Positive for difficulty urinating and flank pain. Negative for dysuria and  hematuria.   Musculoskeletal: Positive for back pain.   All other systems reviewed and are negative.    Physical Exam   First Vitals:  BP: 109/78  Heart Rate: 88  Temp: 99.1  F (37.3  C)  Resp: 18  SpO2: 97 %    Physical Exam   General: Appears stated age  HENT: Moist  Eyes: No scleral injection, conjunctivitis, or drainage.    Neck: Supple with normal ROM. No midline tenderness  Cardio: Regular rate and rhythm, no murmurs, rubs, or gallops  Pulmonary/Chest: Clear to ausculation bilaterally.    Abdomen: Soft, non-tender, normal bowel sounds, no rebound or guarding  Musculoskeletal: No pedal edema, normal gait. PT/DP 2+   Lymph: No anterior or posterior lymphadenopathy.   Neuro: Bilatertal CV tenderness. No midline C/T or L spine tenderness. Normal straight leg raise. Alert and oriented, no focal deficits noted.   Skin: Normal color and temperature, no rashes to visible exposed skin.   Psych: Mood and affect normal.      Emergency Department Course     Laboratory:  Laboratory findings were communicated with the patient who voiced understanding of the findings.    CBC: WNL (WBC 12.4 (H), HGB 13.6, )  BMP: WNL (Creatinine 0.89)    UA: Yellow and clear, o/w Negative    Interventions:  1224 - NS Bolus 1,000mL IV  1224 - Zofran 4 mg IV  1224 - Toradol 15 mg IV    Emergency Department Course:  Nursing notes and vitals reviewed.    IV was inserted and blood was drawn for laboratory testing, results above.    The patient provided a urine sample here in the emergency department. This was sent for laboratory testing, findings above.    1116: I performed an exam of the patient as documented above.   1135: Nurse reported 211 mL prior to void, 0 mL post void.   1231: Patient rechecked and updated. We discussed plan for discharge and the patient will follow-up with urology.     Findings and plan explained to the Patient. Patient discharged home with instructions regarding supportive care, medications, and reasons to return.  The importance of close follow-up was reviewed.     Impression & Plan      Medical Decision Making:    Kendell presents for evaluation of left back pain and difficulty urinating. Work up here today does not support infectious process, the UA is negative, patient is afebrile (temp 99.1), no hematuria I doubt kidney stone. Symptoms have improved after treatment in the ED post void is zero. Patient is able to tolerate oral fluids. No indication for abdominal imaging. Abdomen is non tender to palpation. After pain medications no CVAT to palpation. Patient is stable to be discharged home with close follow up. Provided return precaution. Recommend follow up with urology regarding urinary retention.     Diagnosis:    ICD-10-CM    1. Pain of back and left lower extremity M54.9 CBC with platelets differential    M79.605 Basic metabolic panel     UA with Microscopic   2. Acute urinary retention R33.8        Disposition:  discharged to home      Lizy Braxton  9/14/2018   [facility] EMERGENCY DEPARTMENT  Lizy POLK, tara serving as a scribe at 11:16 AM on 9/14/2018 to document services personally performed by Divine Ayala, ONUR C based on my observations and the provider's statements to me.       Divine Ayala, ONUR CNP  09/14/18 1411

## 2018-09-14 NOTE — ED AVS SNAPSHOT
[facility] Emergency Department    [address]    [facility] [address]    Phone:  [phone]    Fax:  [phone]                                       Kendell Prasad   MRN: [id number]    Department:  [facility] Emergency Department   Date of Visit:  9/14/2018           After Visit Summary Signature Page     I have received my discharge instructions, and my questions have been answered. I have discussed any challenges I see with this plan with the nurse or doctor.    ..........................................................................................................................................  Patient/Patient Representative Signature      ..........................................................................................................................................  Patient Representative Print Name and Relationship to Patient    ..................................................               ................................................  Date                                   Time    ..........................................................................................................................................  Reviewed by Signature/Title    ...................................................              ..............................................  Date                                               Time          22EPIC Rev 08/18

## 2018-09-14 NOTE — ED AVS SNAPSHOT
[facility] Emergency Department    [address] [place] [address]    Phone:  [phone]    Fax:  [phone]                                       Kendell Prasad   MRN: [id number]    Department:  [facility] Emergency Department   Date of Visit:  9/14/2018           Patient Information     Date Of Birth          1990        Your diagnoses for this visit were:     Pain of back and left lower extremity     Acute urinary retention        You were seen by Divine Ayala, ONUR BEARD.      Follow-up Information     Follow up with [facility] In 3 days.    Contact information:    [address]  [phone]  [place] [address] [phone]      Discharge References/Attachments     BACK CARE TIPS (ENGLISH)      24 Hour Appointment Hotline       To make an appointment at any [facility] clinic, call [phone]). If you don't have a family doctor or clinic, we will help you find one. [facility] clinics are conveniently located to serve the needs of you and your family.             Review of your medicines      Our records show that you are taking the medicines listed below. If these are incorrect, please call your family doctor or clinic.        Dose / Directions Last dose taken    cloNIDine 0.1 MG tablet   Commonly known as:  CATAPRES   Dose:  0.1 mg   Quantity:  60 tablet        Take 1 tablet (0.1 mg) by mouth 2 times daily as needed (for anxiety, agitation)   Refills:  1        OLANZapine 10 MG tablet   Commonly known as:  zyPREXA   Dose:  10 mg   Quantity:  30 tablet        Take 1 tablet (10 mg) by mouth every 2 hours as needed (anxiety, depressed moods.)   Refills:  1        * QUEtiapine 100 MG tablet   Commonly known as:  SEROquel   Dose:  100 mg   Quantity:  30 tablet        Take 1 tablet (100 mg) by mouth At Bedtime   Refills:  1        * QUEtiapine 200 MG tablet   Commonly known as:  SEROquel   Dose:  200 mg   Quantity:  30  tablet        Take 1 tablet (200 mg) by mouth daily   Refills:  1        SUBOXONE SL        Refills:  0        traZODone 50 MG tablet   Commonly known as:  DESYREL   Dose:  50 mg   Quantity:  30 tablet        Take 1 tablet (50 mg) by mouth At Bedtime   Refills:  1        * Notice:  This list has 2 medication(s) that are the same as other medications prescribed for you. Read the directions carefully, and ask your doctor or other care provider to review them with you.            Procedures and tests performed during your visit     Basic metabolic panel    Bladder scan    CBC with platelets differential    UA with Microscopic      Orders Needing Specimen Collection     None      Pending Results     No orders found from 9/12/2018 to 9/15/2018.            Pending Culture Results     No orders found from 9/12/2018 to 9/15/2018.            Pending Results Instructions     If you had any lab results that were not finalized at the time of your Discharge, you can call the ED Lab Result RN at [phone]. You will be contacted by this team for any positive Lab results or changes in treatment. The nurses are available 7 days a week from 10A to 6:30P.  You can leave a message 24 hours per day and they will return your call.        Test Results From Your Hospital Stay        9/14/2018 12:15 PM      Component Results     Component Value Ref Range & Units Status    WBC 12.4 (H) 4.0 - 11.0 10e9/L Final    RBC Count 4.61 4.4 - 5.9 10e12/L Final    Hemoglobin 13.6 13.3 - 17.7 g/dL Final    Hematocrit 39.9 (L) 40.0 - 53.0 % Final    MCV 87 78 - 100 fl Final    MCH 29.5 26.5 - 33.0 pg Final    MCHC 34.1 31.5 - 36.5 g/dL Final    RDW 12.9 10.0 - 15.0 % Final    Platelet Count 252 150 - 450 10e9/L Final    Diff Method Automated Method  Final    % Neutrophils 82.2 % Final    % Lymphocytes 11.3 % Final    % Monocytes 5.7 % Final    % Eosinophils 0.2 % Final    % Basophils 0.2 % Final    % Immature Granulocytes 0.4 % Final    Nucleated RBCs  0 0 /100 Final    Absolute Neutrophil 10.2 (H) 1.6 - 8.3 10e9/L Final    Absolute Lymphocytes 1.4 0.8 - 5.3 10e9/L Final    Absolute Monocytes 0.7 0.0 - 1.3 10e9/L Final    Absolute Eosinophils 0.0 0.0 - 0.7 10e9/L Final    Absolute Basophils 0.0 0.0 - 0.2 10e9/L Final    Abs Immature Granulocytes 0.1 0 - 0.4 10e9/L Final    Absolute Nucleated RBC 0.0  Final         9/14/2018 12:28 PM      Component Results     Component Value Ref Range & Units Status    Sodium 140 133 - 144 mmol/L Final    Potassium 4.1 3.4 - 5.3 mmol/L Final    Chloride 104 94 - 109 mmol/L Final    Carbon Dioxide 31 20 - 32 mmol/L Final    Anion Gap 5 3 - 14 mmol/L Final    Glucose 98 70 - 99 mg/dL Final    Urea Nitrogen 15 7 - 30 mg/dL Final    Creatinine 0.89 0.66 - 1.25 mg/dL Final    GFR Estimate >90 >60 mL/min/1.7m2 Final    Non  GFR Calc    GFR Estimate If Black >90 >60 mL/min/1.7m2 Final    African American GFR Calc    Calcium 8.9 8.5 - 10.1 mg/dL Final         9/14/2018 11:59 AM      Component Results     Component Value Ref Range & Units Status    Color Urine Yellow  Final    Appearance Urine Clear  Final    Glucose Urine Negative NEG^Negative mg/dL Final    Bilirubin Urine Negative NEG^Negative Final    Ketones Urine Negative NEG^Negative mg/dL Final    Specific Gravity Urine 1.012 1.003 - 1.035 Final    Blood Urine Negative NEG^Negative Final    pH Urine 7.0 5.0 - 7.0 pH Final    Protein Albumin Urine Negative NEG^Negative mg/dL Final    Urobilinogen mg/dL 0.0 0.0 - 2.0 mg/dL Final    Nitrite Urine Negative NEG^Negative Final    Leukocyte Esterase Urine Negative NEG^Negative Final    Source Midstream Urine  Final    WBC Urine 1 0 - 5 /HPF Final    RBC Urine 1 0 - 2 /HPF Final                Clinical Quality Measure: Blood Pressure Screening     Your blood pressure was checked while you were in the emergency department today. The last reading we obtained was  BP: 109/78 . Please read the guidelines below about what these  numbers mean and what you should do about them.  If your systolic blood pressure (the top number) is less than 120 and your diastolic blood pressure (the bottom number) is less than 80, then your blood pressure is normal. There is nothing more that you need to do about it.  If your systolic blood pressure (the top number) is 120-139 or your diastolic blood pressure (the bottom number) is 80-89, your blood pressure may be higher than it should be. You should have your blood pressure rechecked within a year by a primary care provider.  If your systolic blood pressure (the top number) is 140 or greater or your diastolic blood pressure (the bottom number) is 90 or greater, you may have high blood pressure. High blood pressure is treatable, but if left untreated over time it can put you at risk for heart attack, stroke, or kidney failure. You should have your blood pressure rechecked by a primary care provider within the next 4 weeks.  If your provider in the emergency department today gave you specific instructions to follow-up with your doctor or provider even sooner than that, you should follow that instruction and not wait for up to 4 weeks for your follow-up visit.        Thank you for choosing [facility]       Thank you for choosing [facility] for your care. Our goal is always to provide you with excellent care. Hearing back from our patients is one way we can continue to improve our services. Please take a few minutes to complete the written survey that you may receive in the mail after you visit with us. Thank you!        [employer] Information     [employer] gives you secure access to your electronic health record. If you see a primary care provider, you can also send messages to your care team and make appointments. If you have questions, please call your primary care clinic.  If you do not have a primary care provider, please call [phone] and they will assist you.        Care EveryWhere ID     This is your Care  EveryWhere ID. This could be used by other organizations to access your [facility] medical records  [id number]        Equal Access to Services     SENAIT WATKINS : Sal Lau, lex morales, mono dalton. So [facility] [phone].    ATENCIÓN: Si habla español, tiene a gillis disposición servicios gratuitos de asistencia lingüística. Llame al [phone].    We comply with applicable federal civil rights laws and [place] laws. We do not discriminate on the basis of race, color, national origin, age, disability, sex, sexual orientation, or gender identity.            After Visit Summary       This is your record. Keep this with you and show to your community pharmacist(s) and doctor(s) at your next visit.

## 2018-09-14 NOTE — ED TRIAGE NOTES
Patient comes in for evaluation of left lower back pain with difficulty urinating. Denies injury. ABCs intact.

## 2019-12-15 ENCOUNTER — HEALTH MAINTENANCE LETTER (OUTPATIENT)
Age: 29
End: 2019-12-15

## 2022-08-30 ENCOUNTER — NURSE TRIAGE (OUTPATIENT)
Dept: NURSING | Facility: CLINIC | Age: 32
End: 2022-08-30

## 2022-08-30 VITALS
SYSTOLIC BLOOD PRESSURE: 132 MMHG | HEART RATE: 72 BPM | RESPIRATION RATE: 20 BRPM | OXYGEN SATURATION: 98 % | TEMPERATURE: 98.4 F | WEIGHT: 175 LBS | BODY MASS INDEX: 23.7 KG/M2 | HEIGHT: 72 IN | DIASTOLIC BLOOD PRESSURE: 79 MMHG

## 2022-08-30 PROCEDURE — 80048 BASIC METABOLIC PNL TOTAL CA: CPT | Performed by: EMERGENCY MEDICINE

## 2022-08-30 PROCEDURE — 36415 COLL VENOUS BLD VENIPUNCTURE: CPT | Performed by: EMERGENCY MEDICINE

## 2022-08-30 PROCEDURE — 85014 HEMATOCRIT: CPT | Performed by: EMERGENCY MEDICINE

## 2022-08-30 PROCEDURE — 93005 ELECTROCARDIOGRAM TRACING: CPT

## 2022-08-30 PROCEDURE — 84484 ASSAY OF TROPONIN QUANT: CPT | Performed by: EMERGENCY MEDICINE

## 2022-08-30 PROCEDURE — 999N000104 HC STATISTIC NO CHARGE

## 2022-08-31 ENCOUNTER — HOSPITAL ENCOUNTER (EMERGENCY)
Facility: CLINIC | Age: 32
Discharge: HOME | End: 2022-08-31
Admitting: EMERGENCY MEDICINE
Payer: COMMERCIAL

## 2022-08-31 LAB
ANION GAP SERPL CALCULATED.3IONS-SCNC: 7 MMOL/L (ref 7–15)
ATRIAL RATE - MUSE: 62 BPM
BASOPHILS # BLD AUTO: 0 10E3/UL (ref 0–0.2)
BASOPHILS NFR BLD AUTO: 0 %
BUN SERPL-MCNC: 18.7 MG/DL (ref 6–20)
CALCIUM SERPL-MCNC: 8.9 MG/DL (ref 8.6–10)
CHLORIDE SERPL-SCNC: 103 MMOL/L (ref 98–107)
CREAT SERPL-MCNC: 0.98 MG/DL (ref 0.67–1.17)
DEPRECATED HCO3 PLAS-SCNC: 27 MMOL/L (ref 22–29)
DIASTOLIC BLOOD PRESSURE - MUSE: NORMAL MMHG
EOSINOPHIL # BLD AUTO: 0.2 10E3/UL (ref 0–0.7)
EOSINOPHIL NFR BLD AUTO: 3 %
ERYTHROCYTE [DISTWIDTH] IN BLOOD BY AUTOMATED COUNT: 12.1 % (ref 10–15)
GFR SERPL CREATININE-BSD FRML MDRD: >90 ML/MIN/1.73M2
GLUCOSE SERPL-MCNC: 106 MG/DL (ref 70–99)
HCT VFR BLD AUTO: 37.3 % (ref 40–53)
HGB BLD-MCNC: 12.6 G/DL (ref 13.3–17.7)
HOLD SPECIMEN: NORMAL
HOLD SPECIMEN: NORMAL
IMM GRANULOCYTES # BLD: 0 10E3/UL
IMM GRANULOCYTES NFR BLD: 1 %
INTERPRETATION ECG - MUSE: NORMAL
LYMPHOCYTES # BLD AUTO: 2.4 10E3/UL (ref 0.8–5.3)
LYMPHOCYTES NFR BLD AUTO: 36 %
MCH RBC QN AUTO: 29.9 PG (ref 26.5–33)
MCHC RBC AUTO-ENTMCNC: 33.8 G/DL (ref 31.5–36.5)
MCV RBC AUTO: 89 FL (ref 78–100)
MONOCYTES # BLD AUTO: 0.7 10E3/UL (ref 0–1.3)
MONOCYTES NFR BLD AUTO: 11 %
NEUTROPHILS # BLD AUTO: 3.3 10E3/UL (ref 1.6–8.3)
NEUTROPHILS NFR BLD AUTO: 49 %
NRBC # BLD AUTO: 0 10E3/UL
NRBC BLD AUTO-RTO: 0 /100
P AXIS - MUSE: 73 DEGREES
PLATELET # BLD AUTO: 219 10E3/UL (ref 150–450)
POTASSIUM SERPL-SCNC: 3.8 MMOL/L (ref 3.4–5.3)
PR INTERVAL - MUSE: 160 MS
QRS DURATION - MUSE: 90 MS
QT - MUSE: 376 MS
QTC - MUSE: 381 MS
R AXIS - MUSE: 79 DEGREES
RBC # BLD AUTO: 4.21 10E6/UL (ref 4.4–5.9)
SODIUM SERPL-SCNC: 137 MMOL/L (ref 136–145)
SYSTOLIC BLOOD PRESSURE - MUSE: NORMAL MMHG
T AXIS - MUSE: 40 DEGREES
TROPONIN T SERPL HS-MCNC: 7 NG/L
VENTRICULAR RATE- MUSE: 62 BPM
WBC # BLD AUTO: 6.6 10E3/UL (ref 4–11)

## 2022-08-31 ASSESSMENT — ACTIVITIES OF DAILY LIVING (ADL): ADLS_ACUITY_SCORE: 33

## 2022-08-31 NOTE — TELEPHONE ENCOUNTER
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  Hist of IV heroine user for 8 years, sober for almost 4 years now, has quit smoking cigarettes      Triage to ED, pt is currently sitting at [facility] parking lot and will go in for further evaluation.      Nuris Fu RN, BSN  8/30/2022 at 9:42 PM  [facility]        Reason for Disposition    [1] Chest pain (or \"angina\") comes and goes AND [2] is happening more often (increasing in frequency) or getting worse (increasing in severity) (Exception: chest pains that last only a few seconds)    Additional Information    Negative: SEVERE difficulty breathing (e.g., struggling for each breath, speaks in single words)    Negative: Difficult to awaken or acting confused (e.g., disoriented, slurred speech)    Negative: Shock suspected (e.g., cold/pale/clammy skin, too weak to stand, low BP, rapid pulse)    Negative: Passed out (i.e., lost consciousness, collapsed and was not responding)    Negative: [1] Chest pain lasts > 5 minutes AND [2] age > 44    Negative: [1] Chest pain lasts > 5 minutes AND [2] age > 30 AND [3] one or more cardiac risk factors (e.g., diabetes, high blood pressure, high cholesterol, smoker, or strong family history of heart disease)    Negative: [1] Chest pain lasts > 5 minutes AND [2] history of heart disease (i.e., angina, heart attack, heart failure, bypass surgery, takes nitroglycerin)    Negative: [1] Chest pain lasts > 5 minutes AND [2] described as crushing, pressure-like, or heavy    Negative: Heart beating < 50 beats per minute OR > 140 beats per minute    Negative: Visible sweat on face or sweat " dripping down face    Negative: Sounds like a life-threatening emergency to the triager    Negative: Followed a chest injury    Negative: SEVERE chest pain    Protocols used: CHEST PAIN-A-AH

## 2022-08-31 NOTE — ED TRIAGE NOTES
"Pt arrives to the ED due to having left sided chest pain that has been going on for past 4 days. Pt states that the pain will come and go. Pt denies feeling dizzy/lightheaded or SOB. Pt states having increased stress and states \"it feels like I am having a panic attack\".       "

## 2024-08-28 ENCOUNTER — HOSPITAL ENCOUNTER (EMERGENCY)
Facility: CLINIC | Age: 34
Discharge: LEFT BEFORE DISCHARGE | End: 2024-08-28
Attending: EMERGENCY MEDICINE | Admitting: EMERGENCY MEDICINE
Payer: COMMERCIAL

## 2024-08-28 VITALS
HEART RATE: 102 BPM | DIASTOLIC BLOOD PRESSURE: 65 MMHG | SYSTOLIC BLOOD PRESSURE: 126 MMHG | OXYGEN SATURATION: 94 % | TEMPERATURE: 98.9 F | RESPIRATION RATE: 17 BRPM

## 2024-08-28 DIAGNOSIS — S61.259A OPEN WOUND OF FINGER OF LEFT HAND DUE TO DOG BITE: ICD-10-CM
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
## 2024-08-28 PROCEDURE — 99283 EMERGENCY DEPT VISIT LOW MDM: CPT

## 2024-08-28 PROCEDURE — 250N000009 HC RX 250: Performed by: EMERGENCY MEDICINE

## 2024-08-28 RX ADMIN — Medication 3 ML: at 22:21

## 2024-08-28 ASSESSMENT — ACTIVITIES OF DAILY LIVING (ADL)
ADLS_ACUITY_SCORE: 35
ADLS_ACUITY_SCORE: 35

## 2024-08-29 NOTE — ED TRIAGE NOTES
States playing with his dog, got bit on L hand has punctures and lacerations to L lateral hand and 5th finger. Dogs vaccines up to date     Triage Assessment (Adult)       Row Name 08/28/24 7704          Triage Assessment    Airway WDL WDL        Respiratory WDL    Respiratory WDL WDL        Skin Circulation/Temperature WDL    Skin Circulation/Temperature WDL X        Cardiac WDL    Cardiac WDL WDL        Peripheral/Neurovascular WDL    Peripheral Neurovascular WDL WDL        Cognitive/Neuro/Behavioral WDL    Cognitive/Neuro/Behavioral WDL WDL

## 2024-08-29 NOTE — ED PROVIDER NOTES
Emergency Department Note      History of Present Illness     Chief Complaint   Dog Bite    HPI   Kendell Prasad is a 33 year old male with a history of MRSA who presents for dog bite. Prior to ED arrival, Kendell was playing with his dog when his dog bit him on his left hand. He has not washed the wound since onset. 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      Independent Historian   None    Review or records:  MIIC reviewed, last tetanus 2015.  Reviewed allergies including PCN and pertussis vaccine       Past Medical History     Medical History and Problem List   ADHD  Arthritis  Accidental drug overdose  Cellulitis and abscess of leg  Depression  Drug-seeking behavior  AWILDA  Hepatitis   Suicide attempt  Tobacco use disorder  Ulcer    Medications   Buprenorphine HCl-Naloxone HCL  Clonidine  Olanzapine  Quetiapine  Trazodone     Surgical History   Appendectomy  Exploration of complex index finger laceration   Strabismus surgery  Tonsillectomy  Talus bone surgery    Physical Exam     Patient Vitals for the past 24 hrs:   BP Temp Pulse Resp SpO2   08/28/24 2143 126/65 98.9  F (37.2  C) 102 17 94 %     Physical Exam  General:  Alert, nontoxic in appearance  CV:  Appears well perfused  Lungs:  No obvious respiratory distress  Neuro:  Speaking clearly, no slurred speech  MSK:  4cm Laceration to ulnar aspect of left 5th finger and 2 puncture wounds to radial aspect.  No other injuries noted.      Diagnostics     Lab Results   Labs Ordered and Resulted from Time of ED Arrival to Time of ED Departure - No data to display    Imaging   No orders to display       Independent Interpretation   None    ED Course      Medications Administered   Medications   lido-EPINEPHrine-tetracaine (LET) topical gel GEL (3 mLs Topical $Given 8/28/24 2221)       Procedures   Procedures     Discussion of Management   None    ED Course   ED Course as of 08/28/24 2326   Wed Aug 28, 2024   2207 I evaluated  the patient and obtained the history as noted above.      2319 Pt eloped prior to me being able to make it back into the room.  I called pt and he stated he did not desire to wait any longer and was going to [facility].         Additional Documentation  None    Medical Decision Making / Diagnosis     CMS Diagnoses: None    MIPS       None    MDM   Kendell Prasad is a 33 year old male presents due to a dog bite to his left 5th finger.  He reports it was his dog that bit him.  Pt reports the dog's shots are up to date.  On my evaluation pt had 2 puncture wounds on one side and and longer laceration on the ulnar aspect.  I discussed concerns for infection from a dog bite, but did discuss putting in 1-2 loose stiches on the longer laceration.  I had the pt thoroughly clean the laceration with soap and water and had the nurse put LET on.  I had discussed with the pt that I would be back shortly after the LET.  Unfortunately the pt choose to leave prior to my returning about 35-40 minutes later.  I called the pt and he stated he did not feel I had come back to the room quickly enough so he left.  I apologized, attempting to discuss that I was attending to other patients in the ER as well.  I had told the pt my intention of putting him on an antibiotic during my initial evaluation and attempted to discuss this on the phone again, but the patient was speaking over me and I was unable to.  Pt stated he was going to [facility] ED for further care.      Disposition   The patient was discharged.     Diagnosis     ICD-10-CM    1. Open wound of finger of left hand due to dog bite  S61.259A     W54.0XXA            Discharge Medications   Discharge Medication List as of 8/28/2024 11:15 PM            Scribe Disclosure:  I, Marta Kelly, am serving as a scribe at 10:58 PM on 8/28/2024 to document services personally performed by Huy Pineda MD based on my observations and the provider's statements to me.         Huy Pineda MD  08/28/24 2336       Huy Pineda MD  08/28/24 5609